# Patient Record
Sex: MALE | Race: WHITE | NOT HISPANIC OR LATINO | Employment: FULL TIME | URBAN - METROPOLITAN AREA
[De-identification: names, ages, dates, MRNs, and addresses within clinical notes are randomized per-mention and may not be internally consistent; named-entity substitution may affect disease eponyms.]

---

## 2017-05-18 ENCOUNTER — OFFICE VISIT (OUTPATIENT)
Dept: URGENT CARE | Facility: MEDICAL CENTER | Age: 47
End: 2017-05-18
Payer: COMMERCIAL

## 2017-05-18 PROCEDURE — 99213 OFFICE O/P EST LOW 20 MIN: CPT

## 2022-04-27 ENCOUNTER — APPOINTMENT (EMERGENCY)
Dept: RADIOLOGY | Facility: HOSPITAL | Age: 52
End: 2022-04-27
Payer: COMMERCIAL

## 2022-04-27 ENCOUNTER — HOSPITAL ENCOUNTER (EMERGENCY)
Facility: HOSPITAL | Age: 52
Discharge: HOME | End: 2022-04-27
Attending: EMERGENCY MEDICINE | Admitting: EMERGENCY MEDICINE
Payer: COMMERCIAL

## 2022-04-27 VITALS
RESPIRATION RATE: 18 BRPM | SYSTOLIC BLOOD PRESSURE: 199 MMHG | HEART RATE: 84 BPM | TEMPERATURE: 97.7 F | DIASTOLIC BLOOD PRESSURE: 112 MMHG | OXYGEN SATURATION: 94 %

## 2022-04-27 DIAGNOSIS — R73.9 HIGH BLOOD SUGAR: ICD-10-CM

## 2022-04-27 DIAGNOSIS — R60.9 PERIPHERAL EDEMA: Primary | ICD-10-CM

## 2022-04-27 DIAGNOSIS — I10 HYPERTENSION: ICD-10-CM

## 2022-04-27 LAB
ALBUMIN SERPL BCP-MCNC: 3.4 G/DL (ref 3.5–5)
ALP SERPL-CCNC: 106 U/L (ref 46–116)
ALT SERPL W P-5'-P-CCNC: 33 U/L (ref 12–78)
ANION GAP SERPL CALCULATED.3IONS-SCNC: 5 MMOL/L (ref 4–13)
AST SERPL W P-5'-P-CCNC: 16 U/L (ref 5–45)
ATRIAL RATE: 85 BPM
BASOPHILS # BLD AUTO: 0.06 THOUSANDS/ΜL (ref 0–0.1)
BASOPHILS NFR BLD AUTO: 1 % (ref 0–1)
BILIRUB SERPL-MCNC: 0.29 MG/DL (ref 0.2–1)
BUN SERPL-MCNC: 17 MG/DL (ref 5–25)
CALCIUM ALBUM COR SERPL-MCNC: 9.1 MG/DL (ref 8.3–10.1)
CALCIUM SERPL-MCNC: 8.6 MG/DL (ref 8.3–10.1)
CHLORIDE SERPL-SCNC: 105 MMOL/L (ref 100–108)
CO2 SERPL-SCNC: 29 MMOL/L (ref 21–32)
CREAT SERPL-MCNC: 0.84 MG/DL (ref 0.6–1.3)
EOSINOPHIL # BLD AUTO: 0.51 THOUSAND/ΜL (ref 0–0.61)
EOSINOPHIL NFR BLD AUTO: 5 % (ref 0–6)
ERYTHROCYTE [DISTWIDTH] IN BLOOD BY AUTOMATED COUNT: 13.8 % (ref 11.6–15.1)
GFR SERPL CREATININE-BSD FRML MDRD: 102 ML/MIN/1.73SQ M
GLUCOSE SERPL-MCNC: 153 MG/DL (ref 65–140)
HCT VFR BLD AUTO: 45.9 % (ref 36.5–49.3)
HGB BLD-MCNC: 15.2 G/DL (ref 12–17)
IMM GRANULOCYTES # BLD AUTO: 0.04 THOUSAND/UL (ref 0–0.2)
IMM GRANULOCYTES NFR BLD AUTO: 0 % (ref 0–2)
LYMPHOCYTES # BLD AUTO: 2.11 THOUSANDS/ΜL (ref 0.6–4.47)
LYMPHOCYTES NFR BLD AUTO: 20 % (ref 14–44)
MCH RBC QN AUTO: 28.7 PG (ref 26.8–34.3)
MCHC RBC AUTO-ENTMCNC: 33.1 G/DL (ref 31.4–37.4)
MCV RBC AUTO: 87 FL (ref 82–98)
MONOCYTES # BLD AUTO: 0.92 THOUSAND/ΜL (ref 0.17–1.22)
MONOCYTES NFR BLD AUTO: 9 % (ref 4–12)
NEUTROPHILS # BLD AUTO: 6.81 THOUSANDS/ΜL (ref 1.85–7.62)
NEUTS SEG NFR BLD AUTO: 65 % (ref 43–75)
NRBC BLD AUTO-RTO: 0 /100 WBCS
P AXIS: 56 DEGREES
PLATELET # BLD AUTO: 272 THOUSANDS/UL (ref 149–390)
PMV BLD AUTO: 9.4 FL (ref 8.9–12.7)
POTASSIUM SERPL-SCNC: 3.6 MMOL/L (ref 3.5–5.3)
PR INTERVAL: 168 MS
PROT SERPL-MCNC: 7.2 G/DL (ref 6.4–8.2)
QRS AXIS: 53 DEGREES
QRSD INTERVAL: 96 MS
QT INTERVAL: 360 MS
QTC INTERVAL: 438 MS
RBC # BLD AUTO: 5.29 MILLION/UL (ref 3.88–5.62)
SODIUM SERPL-SCNC: 139 MMOL/L (ref 136–145)
T WAVE AXIS: 21 DEGREES
VENTRICULAR RATE: 89 BPM
WBC # BLD AUTO: 10.45 THOUSAND/UL (ref 4.31–10.16)

## 2022-04-27 PROCEDURE — 80053 COMPREHEN METABOLIC PANEL: CPT

## 2022-04-27 PROCEDURE — 93005 ELECTROCARDIOGRAM TRACING: CPT

## 2022-04-27 PROCEDURE — 93010 ELECTROCARDIOGRAM REPORT: CPT | Performed by: INTERNAL MEDICINE

## 2022-04-27 PROCEDURE — 99284 EMERGENCY DEPT VISIT MOD MDM: CPT

## 2022-04-27 PROCEDURE — 71046 X-RAY EXAM CHEST 2 VIEWS: CPT

## 2022-04-27 PROCEDURE — 85025 COMPLETE CBC W/AUTO DIFF WBC: CPT

## 2022-04-27 PROCEDURE — 36415 COLL VENOUS BLD VENIPUNCTURE: CPT

## 2022-04-27 PROCEDURE — 99285 EMERGENCY DEPT VISIT HI MDM: CPT

## 2022-04-27 RX ORDER — LOSARTAN POTASSIUM 100 MG/1
100 TABLET ORAL DAILY
COMMUNITY

## 2022-04-27 NOTE — DISCHARGE INSTRUCTIONS
Continue using compression stockings and elevating the legs    Follow up outpatient with family practice for long term management of peripheral edema, as well as further management of high blood sugar and hypertension     Return to the ER for chest pain, trouble breathing, passing out, lightheadedness, and or any new/concerning symptoms

## 2022-04-27 NOTE — ED PROVIDER NOTES
History  Chief Complaint   Patient presents with    Leg Swelling     Pt c/o b/l leg swelling for a few days  The patient is a 29-year-old male with history of hypertension who has not seen a doctor in several years who presents to the emergency department for evaluation of worsening to his bilateral lower extremity edema from his baseline  He states that he always has bilateral lower extremity edema from venous insufficiency, however reports that today, when he put his legs up, it did not improve which is new  He states that usually decreases when he does this, and that he wears compression stockings daily  He denies any other symptoms, including chest pain, dyspnea, flank pain, hematuria, fever, chills, abdominal pain  Prior to Admission Medications   Prescriptions Last Dose Informant Patient Reported? Taking?   losartan (COZAAR) 100 MG tablet 4/26/2022 at 0800  Yes Yes   Sig: Take 100 mg by mouth daily      Facility-Administered Medications: None       Past Medical History:   Diagnosis Date    Hypertension        History reviewed  No pertinent surgical history  History reviewed  No pertinent family history  I have reviewed and agree with the history as documented  E-Cigarette/Vaping     E-Cigarette/Vaping Substances     Social History     Tobacco Use    Smoking status: Never Smoker    Smokeless tobacco: Never Used   Substance Use Topics    Alcohol use: Yes     Comment: ocass   Drug use: Never       Review of Systems   Constitutional: Negative for chills and fever  HENT: Negative for congestion and rhinorrhea  Respiratory: Negative for cough and shortness of breath  Cardiovascular: Positive for leg swelling  Negative for chest pain  Gastrointestinal: Negative for abdominal pain, constipation, diarrhea, nausea and vomiting  Genitourinary: Negative for dysuria and flank pain  Musculoskeletal: Negative for arthralgias and myalgias  Skin: Negative for rash and wound  Neurological: Negative for dizziness, weakness, numbness and headaches  Psychiatric/Behavioral: Negative for behavioral problems  Physical Exam  Physical Exam  Vitals and nursing note reviewed  Constitutional:       General: He is not in acute distress  Appearance: He is well-developed  He is obese  He is not toxic-appearing  HENT:      Head: Normocephalic and atraumatic  Mouth/Throat:      Mouth: Mucous membranes are moist    Eyes:      Conjunctiva/sclera: Conjunctivae normal    Cardiovascular:      Rate and Rhythm: Normal rate and regular rhythm  Pulses: Normal pulses  Heart sounds: No murmur heard  Pulmonary:      Effort: Pulmonary effort is normal  No respiratory distress  Breath sounds: Normal breath sounds  Abdominal:      Palpations: Abdomen is soft  Tenderness: There is no abdominal tenderness  Musculoskeletal:         General: No tenderness or deformity  Normal range of motion  Cervical back: Neck supple  Right lower leg: Edema (2+ pitting) present  Left lower leg: Edema (2+ pitting) present  Comments: No overlying cellulitic changes, skin is dry to bilateral legs with flaking consistent with venous insufficiency, no wounds noted  2+ pulses in all 4 extremities  Skin:     General: Skin is warm and dry  Capillary Refill: Capillary refill takes less than 2 seconds  Neurological:      Mental Status: He is alert           Vital Signs  ED Triage Vitals   Temperature Pulse Respirations Blood Pressure SpO2   04/27/22 0131 04/27/22 0130 04/27/22 0130 04/27/22 0130 04/27/22 0130   97 7 °F (36 5 °C) 88 18 (!) 213/120 96 %      Temp Source Heart Rate Source Patient Position - Orthostatic VS BP Location FiO2 (%)   04/27/22 0131 04/27/22 0130 04/27/22 0130 04/27/22 0130 --   Oral Monitor Lying Right arm       Pain Score       04/27/22 0215       No Pain           Vitals:    04/27/22 0130 04/27/22 0131 04/27/22 0215 04/27/22 0230   BP: (!) 213/120 (!) 213/120 (!) 199/99 (!) 199/112   Pulse: 88 76 84 84   Patient Position - Orthostatic VS: Lying Sitting Sitting Lying         Visual Acuity      ED Medications  Medications - No data to display    Diagnostic Studies  Results Reviewed     Procedure Component Value Units Date/Time    Comprehensive metabolic panel [[id number]]  (Abnormal) Collected: 04/27/22 0212    Lab Status: Final result Specimen: Blood from Arm, Left Updated: 04/27/22 0306     Sodium 139 mmol/L      Potassium 3 6 mmol/L      Chloride 105 mmol/L      CO2 29 mmol/L      ANION GAP 5 mmol/L      BUN 17 mg/dL      Creatinine 0 84 mg/dL      Glucose 153 mg/dL      Calcium 8 6 mg/dL      Corrected Calcium 9 1 mg/dL      AST 16 U/L      ALT 33 U/L      Alkaline Phosphatase 106 U/L      Total Protein 7 2 g/dL      Albumin 3 4 g/dL      Total Bilirubin 0 29 mg/dL      eGFR 102 ml/min/1 73sq m     Narrative:      National Kidney Disease Foundation guidelines for Chronic Kidney Disease (CKD):     Stage 1 with normal or high GFR (GFR > 90 mL/min/1 73 square meters)    Stage 2 Mild CKD (GFR = 60-89 mL/min/1 73 square meters)    Stage 3A Moderate CKD (GFR = 45-59 mL/min/1 73 square meters)    Stage 3B Moderate CKD (GFR = 30-44 mL/min/1 73 square meters)    Stage 4 Severe CKD (GFR = 15-29 mL/min/1 73 square meters)    Stage 5 End Stage CKD (GFR <15 mL/min/1 73 square meters)  Note: GFR calculation is accurate only with a steady state creatinine    CBC and differential [[id number]]  (Abnormal) Collected: 04/27/22 0212    Lab Status: Final result Specimen: Blood from Arm, Left Updated: 04/27/22 0220     WBC 10 45 Thousand/uL      RBC 5 29 Million/uL      Hemoglobin 15 2 g/dL      Hematocrit 45 9 %      MCV 87 fL      MCH 28 7 pg      MCHC 33 1 g/dL      RDW 13 8 %      MPV 9 4 fL      Platelets 471 Thousands/uL      nRBC 0 /100 WBCs      Neutrophils Relative 65 %      Immat GRANS % 0 %      Lymphocytes Relative 20 %      Monocytes Relative 9 % Eosinophils Relative 5 %      Basophils Relative 1 %      Neutrophils Absolute 6 81 Thousands/µL      Immature Grans Absolute 0 04 Thousand/uL      Lymphocytes Absolute 2 11 Thousands/µL      Monocytes Absolute 0 92 Thousand/µL      Eosinophils Absolute 0 51 Thousand/µL      Basophils Absolute 0 06 Thousands/µL                  XR chest 2 views   ED Interpretation by Medardo Mohamud PA-C (04/27 0315)   No evidence of infiltrate, pneumothorax, or acute cardiopulmonary disease as interpreted by me                 Procedures  ECG 12 Lead Documentation Only    Date/Time: 4/27/2022 2:20 AM  Performed by: Medardo Mohamud PA-C  Authorized by: Medardo Mohamud PA-C     Comments:      NSR at 89 BPM, normal axis, normal intervals, no ST elevation or depression              ED Course           SBIRT 22yo+      Most Recent Value   SBIRT (23 yo +)    In order to provide better care to our patients, we are screening all of our patients for alcohol and drug use  Would it be okay to ask you these screening questions?  No Filed at: 04/27/2022 0206              MDM  Number of Diagnoses or Management Options  High blood sugar: new and does not require workup  Hypertension: established and worsening  Peripheral edema: established and worsening     Amount and/or Complexity of Data Reviewed  Clinical lab tests: ordered and reviewed  Tests in the radiology section of CPT®: ordered and reviewed  Decide to obtain previous medical records or to obtain history from someone other than the patient: yes  Review and summarize past medical records: yes  Independent visualization of images, tracings, or specimens: yes    Risk of Complications, Morbidity, and/or Mortality  Presenting problems: moderate  Management options: low    Patient Progress  Patient progress: stable    The patient is a 29-year-old male with history of venous insufficiency and hypertension who presents to the emergency department for worsening of his bilateral lower extremity edema  Patient has edema bilaterally at baseline, however reports that it did not decrease with lifting his legs tonight which is abnormal  He denies chest pain, dyspnea, numbness, paresthesias, lightheadedness, dizziness  I discussed with the patient his elevated blood pressure as noted in the ED  Patient does have a history of HTN  Patient is without evidence of end organ damage and denies visual changes, chest pain, numbness/pareshtesias, and or weakness  I discussed the importance of follow-up with PCP regarding HTN; patient verbalized understanding  Will order CBC, CMP, CXR, and EKG    Lab work unremarkable  EKG nonischemic  CXR without acute cardiopulmonary disease  At the time of discharge, the patient is in no acute distress  I discussed with the patient the diagnosis, treatment plan, prompt PCP follow-up, return precautions, and discharge instructions; they were given the opportunity to ask questions and verbalized understanding  Disposition  Final diagnoses:   Peripheral edema   Hypertension   High blood sugar     Time reflects when diagnosis was documented in both MDM as applicable and the Disposition within this note     Time User Action Codes Description Comment    4/27/2022  3:15 AM Jose Alfredo Call Add [R60 9] Peripheral edema     4/27/2022  3:16 AM Jose Alfredo Call Add [I10] Hypertension     4/27/2022  3:17 AM Jose Alfredo Call Add [R73 9] High blood sugar       ED Disposition     ED Disposition Condition Date/Time Comment    Discharge Stable Wed Apr 27, 2022 [id number] [facility] 281 discharge to home/self care              Follow-up Information     Follow up With Specialties Details Why Contact Info Additional [id number] [address] Kip Ang DO Family Medicine Call   [address]  [address] [place] [id number] [phone]       [facility] Family Medicine   [address] [address] [phone]  [facility] [place], [address]  [address], [address], [place], [place], [address] [phone]          Discharge Medication List as of 4/27/2022  3:18 AM      CONTINUE these medications which have NOT CHANGED    Details   losartan (COZAAR) 100 MG tablet Take 100 mg by mouth daily, Historical Med             No discharge procedures on file      PDMP Review     None          ED Provider  Electronically Signed by           Onnie Soulier, PA-C  04/27/22 0001

## 2024-05-10 PROBLEM — C43.59 MALIGNANT MELANOMA OF TORSO EXCLUDING BREAST (HCC): Status: ACTIVE | Noted: 2024-05-10

## 2024-05-15 ENCOUNTER — TELEPHONE (OUTPATIENT)
Dept: GYNECOLOGIC ONCOLOGY | Facility: CLINIC | Age: 54
End: 2024-05-15

## 2024-05-15 ENCOUNTER — CONSULT (OUTPATIENT)
Dept: SURGICAL ONCOLOGY | Facility: CLINIC | Age: 54
End: 2024-05-15
Payer: COMMERCIAL

## 2024-05-15 ENCOUNTER — LAB REQUISITION (OUTPATIENT)
Dept: LAB | Facility: HOSPITAL | Age: 54
End: 2024-05-15
Payer: COMMERCIAL

## 2024-05-15 VITALS
WEIGHT: 315 LBS | HEIGHT: 72 IN | BODY MASS INDEX: 42.66 KG/M2 | OXYGEN SATURATION: 99 % | DIASTOLIC BLOOD PRESSURE: 100 MMHG | TEMPERATURE: 97.7 F | SYSTOLIC BLOOD PRESSURE: 162 MMHG | HEART RATE: 83 BPM

## 2024-05-15 DIAGNOSIS — C43.59 MALIGNANT MELANOMA OF TORSO EXCLUDING BREAST (HCC): Primary | ICD-10-CM

## 2024-05-15 DIAGNOSIS — C43.59 MALIGNANT MELANOMA OF OTHER PART OF TRUNK (HCC): ICD-10-CM

## 2024-05-15 PROCEDURE — 88321 CONSLTJ&REPRT SLD PREP ELSWR: CPT | Performed by: STUDENT IN AN ORGANIZED HEALTH CARE EDUCATION/TRAINING PROGRAM

## 2024-05-15 PROCEDURE — 99204 OFFICE O/P NEW MOD 45 MIN: CPT | Performed by: SURGERY

## 2024-05-15 RX ORDER — TRAMADOL HYDROCHLORIDE 50 MG/1
50 TABLET ORAL EVERY 6 HOURS PRN
Qty: 10 TABLET | Refills: 0 | Status: SHIPPED | OUTPATIENT
Start: 2024-05-15
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
## 2024-05-17 ENCOUNTER — TELEPHONE (OUTPATIENT)
Dept: HEMATOLOGY ONCOLOGY | Facility: CLINIC | Age: 54
End: 2024-05-17

## 2024-05-17 NOTE — TELEPHONE ENCOUNTER
Patient Call    Who are you speaking with? Patient    If it is not the patient, are they listed on an active communication consent form? N/A   What is the reason for this call? Patient calling in regards to his upcoming surgery with Dr Cutler.  Patient wants to confirm that he is scheduled for surgery on 5/22/24. Patient also has a few questions about surgery and would like to speak with the nurse in regards to the surgery questions.  Patient would like a call back.    Does this require a call back? Yes   If a call back is required, please list best call back number [phone], patient gave permission to leave a message    If a call back is required, advise that a message will be forwarded to their care team and someone will return their call as soon as possible.   Did you relay this information to the patient? Yes

## 2024-05-20 ENCOUNTER — APPOINTMENT (OUTPATIENT)
Dept: LAB | Facility: HOSPITAL | Age: 54
End: 2024-05-20
Payer: COMMERCIAL

## 2024-05-20 ENCOUNTER — HOSPITAL ENCOUNTER (OUTPATIENT)
Dept: RADIOLOGY | Facility: HOSPITAL | Age: 54
Discharge: HOME | End: 2024-05-20
Payer: COMMERCIAL

## 2024-05-20 DIAGNOSIS — C43.59 MALIGNANT MELANOMA OF TORSO EXCLUDING BREAST (HCC): ICD-10-CM

## 2024-05-20 LAB
ALBUMIN SERPL BCP-MCNC: 4.3 G/DL (ref 3.5–5)
ALP SERPL-CCNC: 75 U/L (ref 34–104)
ALT SERPL W P-5'-P-CCNC: 23 U/L (ref 7–52)
ANION GAP SERPL CALCULATED.3IONS-SCNC: 7 MMOL/L (ref 4–13)
AST SERPL W P-5'-P-CCNC: 16 U/L (ref 13–39)
BASOPHILS # BLD AUTO: 0.1 THOUSANDS/ÂΜL (ref 0–0.1)
BASOPHILS NFR BLD AUTO: 1 % (ref 0–1)
BILIRUB SERPL-MCNC: 0.83 MG/DL (ref 0.2–1)
BUN SERPL-MCNC: 13 MG/DL (ref 5–25)
CALCIUM SERPL-MCNC: 9.4 MG/DL (ref 8.4–10.2)
CHLORIDE SERPL-SCNC: 105 MMOL/L (ref 96–108)
CO2 SERPL-SCNC: 28 MMOL/L (ref 21–32)
CREAT SERPL-MCNC: 0.77 MG/DL (ref 0.6–1.3)
EOSINOPHIL # BLD AUTO: 0.35 THOUSAND/ÂΜL (ref 0–0.61)
EOSINOPHIL NFR BLD AUTO: 3 % (ref 0–6)
ERYTHROCYTE [DISTWIDTH] IN BLOOD BY AUTOMATED COUNT: 13.9 % (ref 11.6–15.1)
GFR SERPL CREATININE-BSD FRML MDRD: 105 ML/MIN/1.73SQ M
GLUCOSE P FAST SERPL-MCNC: 91 MG/DL (ref 65–99)
HCT VFR BLD AUTO: 44.9 % (ref 36.5–49.3)
HGB BLD-MCNC: 15.3 G/DL (ref 12–17)
IMM GRANULOCYTES # BLD AUTO: 0.04 THOUSAND/UL (ref 0–0.2)
IMM GRANULOCYTES NFR BLD AUTO: 0 % (ref 0–2)
LYMPHOCYTES # BLD AUTO: 2.63 THOUSANDS/ÂΜL (ref 0.6–4.47)
LYMPHOCYTES NFR BLD AUTO: 21 % (ref 14–44)
MCH RBC QN AUTO: 29.3 PG (ref 26.8–34.3)
MCHC RBC AUTO-ENTMCNC: 34.1 G/DL (ref 31.4–37.4)
MCV RBC AUTO: 86 FL (ref 82–98)
MONOCYTES # BLD AUTO: 1.01 THOUSAND/ÂΜL (ref 0.17–1.22)
MONOCYTES NFR BLD AUTO: 8 % (ref 4–12)
NEUTROPHILS # BLD AUTO: 8.38 THOUSANDS/ÂΜL (ref 1.85–7.62)
NEUTS SEG NFR BLD AUTO: 67 % (ref 43–75)
NRBC BLD AUTO-RTO: 0 /100 WBCS
PLATELET # BLD AUTO: 293 THOUSANDS/UL (ref 149–390)
PMV BLD AUTO: 9.9 FL (ref 8.9–12.7)
POTASSIUM SERPL-SCNC: 4.2 MMOL/L (ref 3.5–5.3)
PROT SERPL-MCNC: 7.5 G/DL (ref 6.4–8.4)
RBC # BLD AUTO: 5.23 MILLION/UL (ref 3.88–5.62)
SODIUM SERPL-SCNC: 140 MMOL/L (ref 135–147)
WBC # BLD AUTO: 12.51 THOUSAND/UL (ref 4.31–10.16)

## 2024-05-20 PROCEDURE — 80053 COMPREHEN METABOLIC PANEL: CPT

## 2024-05-20 PROCEDURE — 85025 COMPLETE CBC W/AUTO DIFF WBC: CPT

## 2024-05-20 PROCEDURE — 71046 X-RAY EXAM CHEST 2 VIEWS: CPT

## 2024-05-20 PROCEDURE — 36415 COLL VENOUS BLD VENIPUNCTURE: CPT

## 2024-05-21 ENCOUNTER — ANESTHESIA EVENT (OUTPATIENT)
Dept: PERIOP | Facility: HOSPITAL | Age: 54
End: 2024-05-21
Payer: COMMERCIAL

## 2024-05-21 LAB
ATRIAL RATE: 96 BPM
P AXIS: 52 DEGREES
PR INTERVAL: 208 MS
QRS AXIS: 28 DEGREES
QRSD INTERVAL: 102 MS
QT INTERVAL: 362 MS
QTC INTERVAL: 457 MS
T WAVE AXIS: 36 DEGREES
VENTRICULAR RATE: 96 BPM

## 2024-05-21 PROCEDURE — 93010 ELECTROCARDIOGRAM REPORT: CPT | Performed by: INTERNAL MEDICINE

## 2024-05-21 NOTE — PRE-PROCEDURE INSTRUCTIONS
Pre-Surgery Instructions:   Medication Instructions    losartan (COZAAR) 100 MG tablet Hold day of surgery.    Medication instructions for day surgery reviewed. Please use only a sip of water to take your instructed medications. Avoid all over the counter vitamins, supplements and NSAIDS for one week prior to surgery per anesthesia guidelines. Tylenol is ok to take as needed.     You will receive a call one business day prior to surgery with an arrival time and hospital directions. If your surgery is scheduled on a Monday, the hospital will be calling you on the Friday prior to your surgery. If you have not heard from anyone by 8pm, please call the hospital supervisor through the hospital  at [phone]. ([place] [phone] or [place] [phone]).    Do not eat or drink anything after midnight the night before your surgery, including candy, mints, lifesavers, or chewing gum. Do not drink alcohol 24hrs before your surgery. Try not to smoke at least 24hrs before your surgery.       Follow the pre surgery showering instructions as listed in the “My Surgical Experience Booklet” or otherwise provided by your surgeon's office. Do not use a blade to shave the surgical area 1 week before surgery. It is okay to use a clean electric clippers up to 24 hours before surgery. Do not apply any lotions, creams, including makeup, cologne, deodorant, or perfumes after showering on the day of your surgery. Do not use dry shampoo, hair spray, hair gel, or any type of hair products.     No contact lenses, eye make-up, or artificial eyelashes. Remove nail polish, including gel polish, and any artificial, gel, or acrylic nails if possible. Remove all jewelry including rings and body piercing jewelry.     Wear causal clothing that is easy to take on and off. Consider your type of surgery.    Keep any valuables, jewelry, piercings at home. Please bring any specially ordered equipment (sling, braces) if indicated.    Arrange  for a responsible person to drive you to and from the hospital on the day of your surgery. Please confirm the visitor policy for the day of your procedure when you receive your phone call with an arrival time.     Call the surgeon's office with any new illnesses, exposures, or additional questions prior to surgery.    Please reference your “My Surgical Experience Booklet” for additional information to prepare for your upcoming surgery.

## 2024-05-22 ENCOUNTER — TELEPHONE (OUTPATIENT)
Age: 54
End: 2024-05-22

## 2024-05-22 ENCOUNTER — HOSPITAL ENCOUNTER (OUTPATIENT)
Facility: HOSPITAL | Age: 54
Setting detail: OUTPATIENT SURGERY
Discharge: HOME | End: 2024-05-22
Attending: SURGERY | Admitting: SURGERY
Payer: COMMERCIAL

## 2024-05-22 ENCOUNTER — ANESTHESIA (OUTPATIENT)
Dept: PERIOP | Facility: HOSPITAL | Age: 54
End: 2024-05-22
Payer: COMMERCIAL

## 2024-05-22 ENCOUNTER — HOSPITAL ENCOUNTER (OUTPATIENT)
Dept: NUCLEAR MEDICINE | Facility: HOSPITAL | Age: 54
Discharge: HOME | End: 2024-05-22
Attending: SURGERY
Payer: COMMERCIAL

## 2024-05-22 VITALS
HEART RATE: 89 BPM | RESPIRATION RATE: 14 BRPM | OXYGEN SATURATION: 92 % | DIASTOLIC BLOOD PRESSURE: 89 MMHG | BODY MASS INDEX: 42.66 KG/M2 | HEIGHT: 72 IN | TEMPERATURE: 97.9 F | WEIGHT: 315 LBS | SYSTOLIC BLOOD PRESSURE: 158 MMHG

## 2024-05-22 DIAGNOSIS — C43.59 MALIGNANT MELANOMA OF TORSO EXCLUDING BREAST (HCC): ICD-10-CM

## 2024-05-22 PROBLEM — I10 HTN (HYPERTENSION): Status: ACTIVE | Noted: 2024-05-22

## 2024-05-22 PROBLEM — K27.9 PUD (PEPTIC ULCER DISEASE): Status: ACTIVE | Noted: 2024-05-22

## 2024-05-22 PROCEDURE — A9541 TC99M SULFUR COLLOID: HCPCS

## 2024-05-22 PROCEDURE — 14001 TIS TRNFR TRUNK 10.1-30SQCM: CPT | Performed by: SURGERY

## 2024-05-22 PROCEDURE — 38900 IO MAP OF SENT LYMPH NODE: CPT | Performed by: SURGERY

## 2024-05-22 PROCEDURE — 38531 OPEN BX/EXC INGUINOFEM NODES: CPT | Performed by: SURGERY

## 2024-05-22 PROCEDURE — 88305 TISSUE EXAM BY PATHOLOGIST: CPT | Performed by: PATHOLOGY

## 2024-05-22 PROCEDURE — 88341 IMHCHEM/IMCYTCHM EA ADD ANTB: CPT | Performed by: PATHOLOGY

## 2024-05-22 PROCEDURE — 78195 LYMPH SYSTEM IMAGING: CPT

## 2024-05-22 PROCEDURE — 88307 TISSUE EXAM BY PATHOLOGIST: CPT | Performed by: PATHOLOGY

## 2024-05-22 PROCEDURE — 88342 IMHCHEM/IMCYTCHM 1ST ANTB: CPT | Performed by: PATHOLOGY

## 2024-05-22 RX ORDER — CEFAZOLIN SODIUM 1 G/3ML
INJECTION, POWDER, FOR SOLUTION INTRAMUSCULAR; INTRAVENOUS AS NEEDED
Status: DISCONTINUED | OUTPATIENT
Start: 2024-05-22 | End: 2024-05-22

## 2024-05-22 RX ORDER — ISOSULFAN BLUE 50 MG/5ML
INJECTION, SOLUTION SUBCUTANEOUS AS NEEDED
Status: DISCONTINUED | OUTPATIENT
Start: 2024-05-22 | End: 2024-05-22 | Stop reason: HOSPADM

## 2024-05-22 RX ORDER — ONDANSETRON 2 MG/ML
INJECTION INTRAMUSCULAR; INTRAVENOUS AS NEEDED
Status: DISCONTINUED | OUTPATIENT
Start: 2024-05-22 | End: 2024-05-22

## 2024-05-22 RX ORDER — DEXAMETHASONE SODIUM PHOSPHATE 10 MG/ML
INJECTION, SOLUTION INTRAMUSCULAR; INTRAVENOUS AS NEEDED
Status: DISCONTINUED | OUTPATIENT
Start: 2024-05-22 | End: 2024-05-22

## 2024-05-22 RX ORDER — MIDAZOLAM HYDROCHLORIDE 2 MG/2ML
INJECTION, SOLUTION INTRAMUSCULAR; INTRAVENOUS AS NEEDED
Status: DISCONTINUED | OUTPATIENT
Start: 2024-05-22 | End: 2024-05-22

## 2024-05-22 RX ORDER — BUPIVACAINE HYDROCHLORIDE 2.5 MG/ML
INJECTION, SOLUTION EPIDURAL; INFILTRATION; INTRACAUDAL AS NEEDED
Status: DISCONTINUED | OUTPATIENT
Start: 2024-05-22 | End: 2024-05-22 | Stop reason: HOSPADM

## 2024-05-22 RX ORDER — HYDROMORPHONE HCL/PF 1 MG/ML
0.5 SYRINGE (ML) INJECTION
Status: DISCONTINUED | OUTPATIENT
Start: 2024-05-22 | End: 2024-05-22 | Stop reason: HOSPADM

## 2024-05-22 RX ORDER — PROPOFOL 10 MG/ML
INJECTION, EMULSION INTRAVENOUS AS NEEDED
Status: DISCONTINUED | OUTPATIENT
Start: 2024-05-22 | End: 2024-05-22

## 2024-05-22 RX ORDER — ROCURONIUM BROMIDE 10 MG/ML
INJECTION, SOLUTION INTRAVENOUS AS NEEDED
Status: DISCONTINUED | OUTPATIENT
Start: 2024-05-22 | End: 2024-05-22

## 2024-05-22 RX ORDER — FENTANYL CITRATE 50 UG/ML
INJECTION, SOLUTION INTRAMUSCULAR; INTRAVENOUS AS NEEDED
Status: DISCONTINUED | OUTPATIENT
Start: 2024-05-22 | End: 2024-05-22

## 2024-05-22 RX ORDER — LIDOCAINE HYDROCHLORIDE 20 MG/ML
INJECTION, SOLUTION EPIDURAL; INFILTRATION; INTRACAUDAL; PERINEURAL AS NEEDED
Status: DISCONTINUED | OUTPATIENT
Start: 2024-05-22 | End: 2024-05-22

## 2024-05-22 RX ORDER — KETAMINE HCL IN NACL, ISO-OSM 100MG/10ML
SYRINGE (ML) INJECTION AS NEEDED
Status: DISCONTINUED | OUTPATIENT
Start: 2024-05-22 | End: 2024-05-22

## 2024-05-22 RX ORDER — FENTANYL CITRATE/PF 50 MCG/ML
50 SYRINGE (ML) INJECTION
Status: DISCONTINUED | OUTPATIENT
Start: 2024-05-22 | End: 2024-05-22 | Stop reason: HOSPADM

## 2024-05-22 RX ORDER — GINSENG 100 MG
CAPSULE ORAL AS NEEDED
Status: DISCONTINUED | OUTPATIENT
Start: 2024-05-22 | End: 2024-05-22 | Stop reason: HOSPADM

## 2024-05-22 RX ORDER — SODIUM CHLORIDE 9 MG/ML
125 INJECTION, SOLUTION INTRAVENOUS CONTINUOUS
Status: DISCONTINUED | OUTPATIENT
Start: 2024-05-22 | End: 2024-05-22 | Stop reason: HOSPADM

## 2024-05-22 RX ORDER — ONDANSETRON 2 MG/ML
4 INJECTION INTRAMUSCULAR; INTRAVENOUS ONCE AS NEEDED
Status: DISCONTINUED | OUTPATIENT
Start: 2024-05-22 | End: 2024-05-22 | Stop reason: HOSPADM

## 2024-05-22 RX ADMIN — Medication 50 MG: at 15:18

## 2024-05-22 RX ADMIN — FENTANYL CITRATE 50 MCG: 50 INJECTION INTRAMUSCULAR; INTRAVENOUS at 15:18

## 2024-05-22 RX ADMIN — FENTANYL CITRATE 50 MCG: 50 INJECTION INTRAMUSCULAR; INTRAVENOUS at 15:52

## 2024-05-22 RX ADMIN — SODIUM CHLORIDE: 0.9 INJECTION, SOLUTION INTRAVENOUS at 18:10

## 2024-05-22 RX ADMIN — SODIUM CHLORIDE: 0.9 INJECTION, SOLUTION INTRAVENOUS at 15:38

## 2024-05-22 RX ADMIN — DEXAMETHASONE SODIUM PHOSPHATE 10 MG: 10 INJECTION INTRAMUSCULAR; INTRAVENOUS at 15:18

## 2024-05-22 RX ADMIN — LIDOCAINE HYDROCHLORIDE 100 MG: 20 INJECTION, SOLUTION EPIDURAL; INFILTRATION; INTRACAUDAL at 15:18

## 2024-05-22 RX ADMIN — ROCURONIUM BROMIDE 60 MG: 10 INJECTION, SOLUTION INTRAVENOUS at 15:18

## 2024-05-22 RX ADMIN — ROCURONIUM BROMIDE 20 MG: 10 INJECTION, SOLUTION INTRAVENOUS at 16:45

## 2024-05-22 RX ADMIN — PROPOFOL 200 MG: 10 INJECTION, EMULSION INTRAVENOUS at 15:18

## 2024-05-22 RX ADMIN — MIDAZOLAM 2 MG: 1 INJECTION INTRAMUSCULAR; INTRAVENOUS at 15:12

## 2024-05-22 RX ADMIN — ROCURONIUM BROMIDE 20 MG: 10 INJECTION, SOLUTION INTRAVENOUS at 16:05

## 2024-05-22 RX ADMIN — ROCURONIUM BROMIDE 10 MG: 10 INJECTION, SOLUTION INTRAVENOUS at 17:23

## 2024-05-22 RX ADMIN — FENTANYL CITRATE 50 MCG: 50 INJECTION INTRAMUSCULAR; INTRAVENOUS at 17:23

## 2024-05-22 RX ADMIN — SODIUM CHLORIDE: 0.9 INJECTION, SOLUTION INTRAVENOUS at 14:50

## 2024-05-22 RX ADMIN — SUGAMMADEX 400 MG: 100 INJECTION, SOLUTION INTRAVENOUS at 17:58

## 2024-05-22 RX ADMIN — CEFAZOLIN 3000 MG: 1 INJECTION, POWDER, FOR SOLUTION INTRAMUSCULAR; INTRAVENOUS; PARENTERAL at 15:36

## 2024-05-22 RX ADMIN — ONDANSETRON 4 MG: 2 INJECTION INTRAMUSCULAR; INTRAVENOUS at 15:18

## 2024-05-22 RX ADMIN — FENTANYL CITRATE 50 MCG: 50 INJECTION INTRAMUSCULAR; INTRAVENOUS at 16:46

## 2024-05-22 NOTE — ANESTHESIA PREPROCEDURE EVALUATION
Procedure:  WIDE EXCISION BACK MELANOMA (Back)  BX LYMPH NODE SENTINEL, LYMPHOCINTIGRAPHY, INTRAOPERATIVE LYMPHATIC MAPPING; (Axilla)    Relevant Problems   CARDIO   (+) HTN (hypertension)      GI/HEPATIC   (+) PUD (peptic ulcer disease)        Physical Exam    Airway    Mallampati score: III  TM Distance: >3 FB  Neck ROM: limited     Dental   No notable dental hx     Cardiovascular  Rhythm: regular, Rate: normal, Cardiovascular exam normal    Pulmonary  Pulmonary exam normal Breath sounds clear to auscultation    Other Findings        Anesthesia Plan  ASA Score- 3     Anesthesia Type- general with ASA Monitors.         Additional Monitors:     Airway Plan:            Plan Factors-    Chart reviewed. EKG reviewed.  Existing labs reviewed. Patient summary reviewed.    Patient is a current smoker.              Induction- intravenous.    Postoperative Plan- Plan for postoperative opioid use. Planned trial extubation    Perioperative Resuscitation Plan - Level 1 - Full Code.       Informed Consent- Anesthetic plan and risks discussed with patient.

## 2024-05-22 NOTE — TELEPHONE ENCOUNTER
Chelly  called in to find out the name of patients nurse. He is currently admitted to [facility], provided contact number to hospital for assistance.

## 2024-05-22 NOTE — CASE MANAGEMENT
Case Management Discharge Planning Note    Patient name Da Line  Location OR POOL/OR POOL MRN [id number]  : 1970 Date 2024       Current Admission Date: 2024  Current Admission Diagnosis:HTN (hypertension)   Patient Active Problem List    Diagnosis Date Noted Date Diagnosed    HTN (hypertension) 2024     PUD (peptic ulcer disease) 2024     Malignant melanoma of torso excluding breast (HCC) 05/10/2024       LOS (days): 0  Geometric Mean LOS (GMLOS) (days):   Days to GMLOS:     OBJECTIVE:            Current admission status: Outpatient Surgery   Preferred Pharmacy:   [facility] [address] [place] [address] ROAD  [address] [facility] [address]  Phone: [phone] Fax: [phone]    Primary Care Provider: Jose Jane DO    Primary Insurance: [facility]  Secondary Insurance:     DISCHARGE DETAILS:       Transport at Discharge : Wheelchair van       Additional Comments: CM met with patient at same day surgery, CM discussed transporation post procedure. CM discussed patient to be transported in [facility], as pateint will have sedation prior, patient requested Uber or Lyft, CM explained due to procedure sedation patient could be accompied in [facility]/[employer]ber requested. CM also explained there would be a fee associated with [facility], patient was asking cost, CM messaged amount to bedside nurse approximate amount. CM requested [facility] in [place]. CM explained to patient he could have family or friends transport, patient explained he does not have anyone present in state and his daughter does not drive. CM to follow.    Irina Quinones,

## 2024-05-22 NOTE — ANESTHESIA POSTPROCEDURE EVALUATION
Post-Op Assessment Note    CV Status:  Stable    Pain management: adequate       Mental Status:  Arousable     Post Op Vitals Reviewed: Yes    No anethesia notable event occurred.    Staff: CRNA               BP (!) 189/87 (05/22/24 1830)    Temp 97.5 °F (36.4 °C) (05/22/24 1830)    Pulse 88 (05/22/24 1830)   Resp 16 (05/22/24 1830)    SpO2 90 % (05/22/24 1830)

## 2024-05-22 NOTE — OP NOTE
OPERATIVE REPORT  PATIENT NAME: Da Rogel    :  1970  MRN: [id number]  Pt Location: [facility] OR ROOM 06    SURGERY DATE: 2024    Surgeons and Role:     * Bradford Cutler MD - Primary     * Ciara Ferrari MD - Assisting    Preop Diagnosis:  Malignant melanoma of torso excluding breast (HCC) [C43.59]    Post-Op Diagnosis Codes:     * Malignant melanoma of torso excluding breast (HCC) [C43.59]    Procedure(s):  WIDE EXCISION BACK MELANOMA; ADVANCEMENT FLAP CLOSURE OF DEFECT  BX LYMPH NODE SENTINEL,  INGUINAL. LYMPHOCINTIGRAPHY. INTRAOPERATIVE LYMPHATIC MAPPING;    Specimen(s):  ID Type Source Tests Collected by Time Destination   1 : back melanoma 12 o'clock 1 clip, 3 o'clock 2 clip, 6 o'clock 3 clip and 9 o'clock is 4 clip Tissue Soft Tissue, Other TISSUE EXAM Bradford Cutler MD 2024 1601    2 : left inguinal sentinel node stitch marks hottest part Tissue Lymph Node, [place] TISSUE EXAM Bradford Cutler MD 2024 1600    3 : left inguinal sentinel node number 2 Tissue Lymph Node, [place] TISSUE EXAM Bradford Cutler MD 2024 1749        Estimated Blood Loss:   Minimal    Drains:  * No LDAs found *    Anesthesia Type:   General    Operative Indications:  Malignant melanoma of torso excluding breast (HCC) [C43.59]      Operative Findings:  1.5 mm deep melanoma 1 cm in diameter , mid back midline.  Resulting defect post excision measuring 7.0 x 3.5 x 1.5 cm in dimension    Left inguinal sentinel node with maximum gamma count of 91.  Second left inguinal sentinel node sent with gamma count of 8.        Complications:   None    Procedure and Technique:  Patient is a 53-year-old man with a 1.5 mm deep melanoma of the mid back in the midline.  He comes in for wide excision and sentinel node biopsy.    He had been marked by the radiology team in the left groin for the sentinel node.  He was then brought to the operating room and identified by proper timeout.  He is then intubated by the anesthesia  team and positioned in the right lateral decubitus position.  The back lesion identified.  He was prepped and draped in usual fashion.  5 cc of methylene blue was injected in the dermal compartment in the specimen around the lesion.  1.5 cm margins were drawn around the lesion and the skin anesthetized.  The ellipse was incised sharply.  Cautery was used to obtain a full-thickness excision down to back fascia.  The resulting defect measured 7.0 x 3.5 x 1.5 cm in size.  Specimen was oriented with sutures and clips.  We then inspected the field for hemostasis.  Flaps were then created by undermining the skin in all directions to allow for advancement flap closure of the wound.  We then used 0 Vicryl sutures in interrupted fashion to close the deep dermal layers.  We then used interrupted vertical mattress 2-0 nylon sutures for the rest of the closure.  Antibiotic was applied followed by gauze and Tegaderm.    Patient was then repositioned and redraped after the back portion of the operation.  He was prepped with the left groin exposed.  The lymphoscintigraphy marks were identified.  The area is shaved prepped draped in usual fashion.  Incisions were made over the marked area after the skin was anesthetized.  Cautery was used to dissect the dermis and subtenons tissue to help with locate radioactive node packet.  The first of these had a maximum gamma count of 91 and was dissected out from surrounding tissue in hemostatic fashion.  Small vessels were clipped.  A second lymphatic packet was sent as well.  The field was then irrigated, and once were sure of hemostasis, 3-0 Vicryl used to close the Elina's fascia followed by 3-0 Vicryl for the dermis followed by 4 Monocryl subcuticular skin closure.  Skin glue was then applied.  Patient was then awakened and transferred to recovery room in stable condition.   I was present for the entire procedure.    Patient Disposition:  PACU     Wide Local Excision for Primary  Cutaneous Melanoma - Excision 1 (Back)  Operation performed with curative intent Yes   Original Breslow thickness of the lesion 1.5 mm (to the tenth of a millimeter)   Clinical margin width   (measured from the edge of the lesion or the prior excision scar) Other: 1.5 cm due to cosmetic/anatomic concerns   Depth of excision Full-thickness skin/subcutaneous tissue down to fascia (melanoma)            SIGNATURE: Bradford Cutler MD  DATE: May 22, 2024  TIME: 6:07 PM

## 2024-05-22 NOTE — OR NURSING
Contact lenses put in case and returned to [facility], JUANA Awan RN. VIV Solomon RN

## 2024-05-23 NOTE — DISCHARGE INSTR - AVS FIRST PAGE
Surgery Discharge Instructions     Please follow-up as instructed. If you do not already have a follow-up appointment, please call the office when you leave to schedule an appointment to be seen in 2-3 weeks for post-operative re-evaluation.     Activity:  - No lifting greater than 20 pounds or strenuous physical activity or exercise for 2 weeks.  - Walking and normal light activities are encouraged.  - Normal daily activities including climbing steps are okay.  - No driving until no longer using pain medications.     Return to work:    - You may return to work when you are feeling well enough.     Diet:    - You may resume your normal diet.     Wound Care:  - May shower daily. No tub baths or swimming until cleared by your surgeon.  - Wash incision gently with soap and water and pat dry.  - Do not apply any creams or ointments unless instructed to do so by your surgeon.  - You may apply ice as needed (no longer than 20 minutes at a time) for the first 48 hours.  - Bruising is not unusual and will go away with a little time. You may apply a warm, moist compress that may help the bruising resolve quicker.  - You may remove the dressings the day after surgery (unless otherwise instructed). Leave any skin tapes (steri-strips) on the incision(s) in place until they fall off on their own. Any new dressings are optional.     Medications:    - You may resume all of your regular medications after discharge unless otherwise instructed. Please refer to your discharge medication list for further details.  - Please take the pain medications as directed.  - You are encouraged to use non-narcotic pain medications first and whenever possible. Reserve the use of narcotic pain medication for moderate to severe pain not controlled by non-narcotic medications.  - No driving while taking narcotic pain medications.  - You may become constipated, especially if taking pain medications. You may take any over the counter stool softeners or  laxatives as needed. Examples: Milk of Magnesia, Colace, Senna.    Additional Instructions:  - If you have any questions or concerns after discharge please call the office.  - Call office or return to ER if fever greater than 101, chills, persistent nausea/vomiting, worsening/uncontrollable pain, and/or increasing redness or purulent/foul smelling drainage from incision(s).

## 2024-06-04 ENCOUNTER — TELEPHONE (OUTPATIENT)
Dept: HEMATOLOGY ONCOLOGY | Facility: CLINIC | Age: 54
End: 2024-06-04

## 2024-06-04 NOTE — TELEPHONE ENCOUNTER
Appointment Confirmation   Who are you speaking with? Patient   If it is not the patient, are they listed on an active communication consent form? N/A   Which provider is the appointment scheduled with?  Dr. Cutler   When is the appointment scheduled?  Please list date and time 06/05/2024 @ 9:30AM    At which location is the appointment scheduled to take place? [place]   Did caller verbalize understanding of appointment details? Yes

## 2024-06-05 ENCOUNTER — OFFICE VISIT (OUTPATIENT)
Dept: SURGICAL ONCOLOGY | Facility: CLINIC | Age: 54
End: 2024-06-05

## 2024-06-05 VITALS
WEIGHT: 315 LBS | HEART RATE: 94 BPM | DIASTOLIC BLOOD PRESSURE: 84 MMHG | SYSTOLIC BLOOD PRESSURE: 158 MMHG | HEIGHT: 72 IN | RESPIRATION RATE: 18 BRPM | BODY MASS INDEX: 42.66 KG/M2 | TEMPERATURE: 97.9 F | OXYGEN SATURATION: 95 %

## 2024-06-05 DIAGNOSIS — C43.59 MALIGNANT MELANOMA OF TORSO EXCLUDING BREAST (HCC): Primary | ICD-10-CM

## 2024-06-05 PROCEDURE — 99024 POSTOP FOLLOW-UP VISIT: CPT | Performed by: SURGERY
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
## 2024-06-13 PROCEDURE — 88307 TISSUE EXAM BY PATHOLOGIST: CPT | Performed by: PATHOLOGY

## 2024-06-13 PROCEDURE — 88341 IMHCHEM/IMCYTCHM EA ADD ANTB: CPT | Performed by: PATHOLOGY

## 2024-06-13 PROCEDURE — 88305 TISSUE EXAM BY PATHOLOGIST: CPT | Performed by: PATHOLOGY

## 2024-06-13 PROCEDURE — 88342 IMHCHEM/IMCYTCHM 1ST ANTB: CPT | Performed by: PATHOLOGY

## 2024-06-18 ENCOUNTER — TELEPHONE (OUTPATIENT)
Dept: HEMATOLOGY ONCOLOGY | Facility: CLINIC | Age: 54
End: 2024-06-18

## 2024-06-18 NOTE — TELEPHONE ENCOUNTER
Patient Call    Who are you speaking with? Patient    If it is not the patient, are they listed on an active communication consent form? N/A   What is the reason for this call? Patient calling to speak with Dr. Cutler,  His incision is open and oozing. It doesn't hurt just a little itching   Does this require a call back? yes   If a call back is required, please list best call back number [phone]   If a call back is required, advise that a message will be forwarded to their care team and someone will return their call as soon as possible.   Did you relay this information to the patient? Yes

## 2024-06-18 NOTE — TELEPHONE ENCOUNTER
Spoke to patient, reviewed final pathology and agreed to mail a copy to his home. He will email a picture of his incision to me, and agrees to go to [facility] on [address] to be evaluated.

## 2024-10-18 ENCOUNTER — APPOINTMENT (EMERGENCY)
Dept: CT IMAGING | Facility: HOSPITAL | Age: 54
DRG: 603 | End: 2024-10-18
Payer: COMMERCIAL

## 2024-10-18 ENCOUNTER — HOSPITAL ENCOUNTER (INPATIENT)
Facility: HOSPITAL | Age: 54
LOS: 3 days | Discharge: HOME | DRG: 603 | End: 2024-10-21
Attending: EMERGENCY MEDICINE | Admitting: HOSPITALIST
Payer: COMMERCIAL

## 2024-10-18 DIAGNOSIS — I10 HTN (HYPERTENSION): ICD-10-CM

## 2024-10-18 DIAGNOSIS — L03.314 CELLULITIS OF GROIN, LEFT: Primary | ICD-10-CM

## 2024-10-18 DIAGNOSIS — L03.818 CELLULITIS OF OTHER SPECIFIED SITE: ICD-10-CM

## 2024-10-18 LAB
ALBUMIN SERPL BCG-MCNC: 3.7 G/DL (ref 3.5–5)
ALP SERPL-CCNC: 78 U/L (ref 34–104)
ALT SERPL W P-5'-P-CCNC: 30 U/L (ref 7–52)
ANION GAP SERPL CALCULATED.3IONS-SCNC: 6 MMOL/L (ref 4–13)
APTT PPP: 34 SECONDS (ref 23–34)
AST SERPL W P-5'-P-CCNC: 15 U/L (ref 13–39)
ATRIAL RATE: 101 BPM
BACTERIA UR QL AUTO: ABNORMAL /HPF
BASOPHILS # BLD AUTO: 0.13 THOUSANDS/ΜL (ref 0–0.1)
BASOPHILS NFR BLD AUTO: 1 % (ref 0–1)
BILIRUB SERPL-MCNC: 0.75 MG/DL (ref 0.2–1)
BILIRUB UR QL STRIP: NEGATIVE
BUN SERPL-MCNC: 9 MG/DL (ref 5–25)
CALCIUM SERPL-MCNC: 9.1 MG/DL (ref 8.4–10.2)
CHLORIDE SERPL-SCNC: 101 MMOL/L (ref 96–108)
CLARITY UR: CLEAR
CO2 SERPL-SCNC: 30 MMOL/L (ref 21–32)
COLOR UR: COLORLESS
CREAT SERPL-MCNC: 0.7 MG/DL (ref 0.6–1.3)
EOSINOPHIL # BLD AUTO: 0.38 THOUSAND/ΜL (ref 0–0.61)
EOSINOPHIL NFR BLD AUTO: 3 % (ref 0–6)
ERYTHROCYTE [DISTWIDTH] IN BLOOD BY AUTOMATED COUNT: 13.4 % (ref 11.6–15.1)
GFR SERPL CREATININE-BSD FRML MDRD: 108 ML/MIN/1.73SQ M
GLUCOSE SERPL-MCNC: 104 MG/DL (ref 65–140)
GLUCOSE UR STRIP-MCNC: NEGATIVE MG/DL
HCT VFR BLD AUTO: 41.2 % (ref 36.5–49.3)
HGB BLD-MCNC: 13.8 G/DL (ref 12–17)
HGB UR QL STRIP.AUTO: NEGATIVE
IMM GRANULOCYTES # BLD AUTO: 0.12 THOUSAND/UL (ref 0–0.2)
IMM GRANULOCYTES NFR BLD AUTO: 1 % (ref 0–2)
INR PPP: 1.14 (ref 0.85–1.19)
KETONES UR STRIP-MCNC: NEGATIVE MG/DL
LACTATE SERPL-SCNC: 0.8 MMOL/L (ref 0.5–2)
LEUKOCYTE ESTERASE UR QL STRIP: NEGATIVE
LYMPHOCYTES # BLD AUTO: 1.71 THOUSANDS/ΜL (ref 0.6–4.47)
LYMPHOCYTES NFR BLD AUTO: 11 % (ref 14–44)
MCH RBC QN AUTO: 28.1 PG (ref 26.8–34.3)
MCHC RBC AUTO-ENTMCNC: 33.5 G/DL (ref 31.4–37.4)
MCV RBC AUTO: 84 FL (ref 82–98)
MONOCYTES # BLD AUTO: 1.52 THOUSAND/ΜL (ref 0.17–1.22)
MONOCYTES NFR BLD AUTO: 10 % (ref 4–12)
NEUTROPHILS # BLD AUTO: 11.62 THOUSANDS/ΜL (ref 1.85–7.62)
NEUTS SEG NFR BLD AUTO: 74 % (ref 43–75)
NITRITE UR QL STRIP: NEGATIVE
NON-SQ EPI CELLS URNS QL MICRO: ABNORMAL /HPF
NRBC BLD AUTO-RTO: 0 /100 WBCS
P AXIS: 48 DEGREES
PH UR STRIP.AUTO: 8 [PH]
PLATELET # BLD AUTO: 359 THOUSANDS/UL (ref 149–390)
PMV BLD AUTO: 9 FL (ref 8.9–12.7)
POTASSIUM SERPL-SCNC: 3.4 MMOL/L (ref 3.5–5.3)
PR INTERVAL: 180 MS
PROCALCITONIN SERPL-MCNC: 0.09 NG/ML
PROT SERPL-MCNC: 7.6 G/DL (ref 6.4–8.4)
PROT UR STRIP-MCNC: NEGATIVE MG/DL
PROTHROMBIN TIME: 14.8 SECONDS (ref 12.3–15)
QRS AXIS: 42 DEGREES
QRSD INTERVAL: 104 MS
QT INTERVAL: 360 MS
QTC INTERVAL: 466 MS
RBC # BLD AUTO: 4.91 MILLION/UL (ref 3.88–5.62)
RBC #/AREA URNS AUTO: ABNORMAL /HPF
SODIUM SERPL-SCNC: 137 MMOL/L (ref 135–147)
SP GR UR STRIP.AUTO: 1.03 (ref 1–1.03)
T WAVE AXIS: 17 DEGREES
UROBILINOGEN UR STRIP-ACNC: <2 MG/DL
VENTRICULAR RATE: 101 BPM
WBC # BLD AUTO: 15.48 THOUSAND/UL (ref 4.31–10.16)
WBC #/AREA URNS AUTO: ABNORMAL /HPF

## 2024-10-18 PROCEDURE — 36415 COLL VENOUS BLD VENIPUNCTURE: CPT | Performed by: EMERGENCY MEDICINE

## 2024-10-18 PROCEDURE — 85610 PROTHROMBIN TIME: CPT | Performed by: EMERGENCY MEDICINE

## 2024-10-18 PROCEDURE — 93005 ELECTROCARDIOGRAM TRACING: CPT

## 2024-10-18 PROCEDURE — 87040 BLOOD CULTURE FOR BACTERIA: CPT | Performed by: EMERGENCY MEDICINE

## 2024-10-18 PROCEDURE — 96365 THER/PROPH/DIAG IV INF INIT: CPT

## 2024-10-18 PROCEDURE — 80053 COMPREHEN METABOLIC PANEL: CPT | Performed by: EMERGENCY MEDICINE

## 2024-10-18 PROCEDURE — 99284 EMERGENCY DEPT VISIT MOD MDM: CPT

## 2024-10-18 PROCEDURE — 85730 THROMBOPLASTIN TIME PARTIAL: CPT | Performed by: EMERGENCY MEDICINE

## 2024-10-18 PROCEDURE — 81001 URINALYSIS AUTO W/SCOPE: CPT | Performed by: EMERGENCY MEDICINE

## 2024-10-18 PROCEDURE — 83605 ASSAY OF LACTIC ACID: CPT | Performed by: EMERGENCY MEDICINE

## 2024-10-18 PROCEDURE — 93010 ELECTROCARDIOGRAM REPORT: CPT

## 2024-10-18 PROCEDURE — 99223 1ST HOSP IP/OBS HIGH 75: CPT | Performed by: HOSPITALIST

## 2024-10-18 PROCEDURE — 96367 TX/PROPH/DG ADDL SEQ IV INF: CPT

## 2024-10-18 PROCEDURE — 99285 EMERGENCY DEPT VISIT HI MDM: CPT | Performed by: EMERGENCY MEDICINE

## 2024-10-18 PROCEDURE — 84145 PROCALCITONIN (PCT): CPT | Performed by: EMERGENCY MEDICINE

## 2024-10-18 PROCEDURE — 74177 CT ABD & PELVIS W/CONTRAST: CPT

## 2024-10-18 PROCEDURE — 96368 THER/DIAG CONCURRENT INF: CPT

## 2024-10-18 PROCEDURE — 85025 COMPLETE CBC W/AUTO DIFF WBC: CPT | Performed by: EMERGENCY MEDICINE

## 2024-10-18 PROCEDURE — 96366 THER/PROPH/DIAG IV INF ADDON: CPT

## 2024-10-18 RX ORDER — LOSARTAN POTASSIUM 50 MG/1
100 TABLET ORAL EVERY MORNING
Status: DISCONTINUED | OUTPATIENT
Start: 2024-10-19 | End: 2024-10-21 | Stop reason: HOSPADM

## 2024-10-18 RX ORDER — ENOXAPARIN SODIUM 100 MG/ML
40 INJECTION SUBCUTANEOUS DAILY
Status: DISCONTINUED | OUTPATIENT
Start: 2024-10-19 | End: 2024-10-21 | Stop reason: HOSPADM

## 2024-10-18 RX ADMIN — SODIUM CHLORIDE, SODIUM LACTATE, POTASSIUM CHLORIDE, AND CALCIUM CHLORIDE 1000 ML: .6; .31; .03; .02 INJECTION, SOLUTION INTRAVENOUS at 15:44

## 2024-10-18 RX ADMIN — PIPERACILLIN SODIUM AND TAZOBACTAM SODIUM 4.5 G: 36; 4.5 INJECTION, POWDER, LYOPHILIZED, FOR SOLUTION INTRAVENOUS at 14:50

## 2024-10-18 RX ADMIN — SODIUM CHLORIDE, SODIUM LACTATE, POTASSIUM CHLORIDE, AND CALCIUM CHLORIDE 1000 ML: .6; .31; .03; .02 INJECTION, SOLUTION INTRAVENOUS at 16:25

## 2024-10-18 RX ADMIN — VANCOMYCIN HYDROCHLORIDE 2000 MG: 1 INJECTION, POWDER, LYOPHILIZED, FOR SOLUTION INTRAVENOUS at 15:46

## 2024-10-18 RX ADMIN — SODIUM CHLORIDE, SODIUM LACTATE, POTASSIUM CHLORIDE, AND CALCIUM CHLORIDE 1000 ML: .6; .31; .03; .02 INJECTION, SOLUTION INTRAVENOUS at 14:38

## 2024-10-18 RX ADMIN — PIPERACILLIN SODIUM AND TAZOBACTAM SODIUM 4.5 G: 36; 4.5 INJECTION, POWDER, LYOPHILIZED, FOR SOLUTION INTRAVENOUS at 19:08

## 2024-10-18 RX ADMIN — IOHEXOL 100 ML: 350 INJECTION, SOLUTION INTRAVENOUS at 15:43
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
## 2024-10-18 NOTE — LETTER
[facility] [address]  [address] [address]  Dept: [phone]    October 21, 2024     Patient: Da Rogel   YOB: 1970   Date of Visit: 10/18/2024       To Whom it May Concern:    Da Rogel is under my professional care. He was seen in the hospital from 10/18/2024 to 10/21/24. He may return to work on 10/23/24 without limitations.    If you have any questions or concerns, please don't hesitate to call.         Sincerely,          Valorie Camarena, DO

## 2024-10-18 NOTE — ASSESSMENT & PLAN NOTE
Presents with 1 week of worsening left groin cellulitis and wound.    He was on an unknown antibiotic that he started yesterday and he states it started with the ESTEPHANIE.  But there is no record of it    When I am looking at his wound is actually pretty significantly bleeding.      He received Zosyn and vancomycin in the ER  I will continue the Zosyn for now.  Check a MRSA nasal swab    Consults ID for assistance with antibiotics   Consult acute surgery ith this bleeding it may need some debridement or cauterization

## 2024-10-19 PROBLEM — B35.6 TINEA CRURIS: Status: ACTIVE | Noted: 2024-10-19

## 2024-10-19 PROBLEM — E66.01 MORBID OBESITY (HCC): Status: ACTIVE | Noted: 2024-10-19

## 2024-10-19 LAB
ANION GAP SERPL CALCULATED.3IONS-SCNC: 6 MMOL/L (ref 4–13)
BASOPHILS # BLD AUTO: 0.1 THOUSANDS/ΜL (ref 0–0.1)
BASOPHILS NFR BLD AUTO: 1 % (ref 0–1)
BUN SERPL-MCNC: 14 MG/DL (ref 5–25)
CALCIUM SERPL-MCNC: 8.6 MG/DL (ref 8.4–10.2)
CHLORIDE SERPL-SCNC: 106 MMOL/L (ref 96–108)
CO2 SERPL-SCNC: 26 MMOL/L (ref 21–32)
CREAT SERPL-MCNC: 0.72 MG/DL (ref 0.6–1.3)
EOSINOPHIL # BLD AUTO: 0.42 THOUSAND/ΜL (ref 0–0.61)
EOSINOPHIL NFR BLD AUTO: 4 % (ref 0–6)
ERYTHROCYTE [DISTWIDTH] IN BLOOD BY AUTOMATED COUNT: 13.6 % (ref 11.6–15.1)
GFR SERPL CREATININE-BSD FRML MDRD: 107 ML/MIN/1.73SQ M
GLUCOSE SERPL-MCNC: 154 MG/DL (ref 65–140)
HCT VFR BLD AUTO: 41.2 % (ref 36.5–49.3)
HGB BLD-MCNC: 13.4 G/DL (ref 12–17)
IMM GRANULOCYTES # BLD AUTO: 0.09 THOUSAND/UL (ref 0–0.2)
IMM GRANULOCYTES NFR BLD AUTO: 1 % (ref 0–2)
LYMPHOCYTES # BLD AUTO: 1.41 THOUSANDS/ΜL (ref 0.6–4.47)
LYMPHOCYTES NFR BLD AUTO: 14 % (ref 14–44)
MAGNESIUM SERPL-MCNC: 2.1 MG/DL (ref 1.9–2.7)
MCH RBC QN AUTO: 28.9 PG (ref 26.8–34.3)
MCHC RBC AUTO-ENTMCNC: 32.5 G/DL (ref 31.4–37.4)
MCV RBC AUTO: 89 FL (ref 82–98)
MONOCYTES # BLD AUTO: 0.98 THOUSAND/ΜL (ref 0.17–1.22)
MONOCYTES NFR BLD AUTO: 10 % (ref 4–12)
NEUTROPHILS # BLD AUTO: 6.95 THOUSANDS/ΜL (ref 1.85–7.62)
NEUTS SEG NFR BLD AUTO: 70 % (ref 43–75)
NRBC BLD AUTO-RTO: 0 /100 WBCS
PLATELET # BLD AUTO: 313 THOUSANDS/UL (ref 149–390)
PMV BLD AUTO: 10.2 FL (ref 8.9–12.7)
POTASSIUM SERPL-SCNC: 4 MMOL/L (ref 3.5–5.3)
RBC # BLD AUTO: 4.63 MILLION/UL (ref 3.88–5.62)
SODIUM SERPL-SCNC: 138 MMOL/L (ref 135–147)
WBC # BLD AUTO: 9.95 THOUSAND/UL (ref 4.31–10.16)

## 2024-10-19 PROCEDURE — 10061 I&D ABSCESS COMP/MULTIPLE: CPT | Performed by: SPECIALIST

## 2024-10-19 PROCEDURE — 80048 BASIC METABOLIC PNL TOTAL CA: CPT | Performed by: HOSPITALIST

## 2024-10-19 PROCEDURE — 87070 CULTURE OTHR SPECIMN AEROBIC: CPT

## 2024-10-19 PROCEDURE — 99223 1ST HOSP IP/OBS HIGH 75: CPT | Performed by: INTERNAL MEDICINE

## 2024-10-19 PROCEDURE — 87077 CULTURE AEROBIC IDENTIFY: CPT

## 2024-10-19 PROCEDURE — 0J9C0ZZ DRAINAGE OF PELVIC REGION SUBCUTANEOUS TISSUE AND FASCIA, OPEN APPROACH: ICD-10-PCS | Performed by: SPECIALIST

## 2024-10-19 PROCEDURE — 85025 COMPLETE CBC W/AUTO DIFF WBC: CPT | Performed by: HOSPITALIST

## 2024-10-19 PROCEDURE — 87205 SMEAR GRAM STAIN: CPT

## 2024-10-19 PROCEDURE — 99222 1ST HOSP IP/OBS MODERATE 55: CPT | Performed by: SPECIALIST

## 2024-10-19 PROCEDURE — 83735 ASSAY OF MAGNESIUM: CPT | Performed by: HOSPITALIST

## 2024-10-19 PROCEDURE — 99232 SBSQ HOSP IP/OBS MODERATE 35: CPT | Performed by: STUDENT IN AN ORGANIZED HEALTH CARE EDUCATION/TRAINING PROGRAM

## 2024-10-19 RX ORDER — HYDRALAZINE HYDROCHLORIDE 20 MG/ML
10 INJECTION INTRAMUSCULAR; INTRAVENOUS EVERY 6 HOURS PRN
Status: DISCONTINUED | OUTPATIENT
Start: 2024-10-19 | End: 2024-10-21 | Stop reason: HOSPADM

## 2024-10-19 RX ORDER — OXYCODONE HYDROCHLORIDE 5 MG/1
5 TABLET ORAL EVERY 6 HOURS PRN
Status: DISCONTINUED | OUTPATIENT
Start: 2024-10-19 | End: 2024-10-21 | Stop reason: HOSPADM

## 2024-10-19 RX ORDER — LABETALOL HYDROCHLORIDE 5 MG/ML
10 INJECTION, SOLUTION INTRAVENOUS EVERY 6 HOURS PRN
Status: DISCONTINUED | OUTPATIENT
Start: 2024-10-19 | End: 2024-10-21 | Stop reason: HOSPADM

## 2024-10-19 RX ORDER — AMLODIPINE BESYLATE 5 MG/1
5 TABLET ORAL DAILY
Status: DISCONTINUED | OUTPATIENT
Start: 2024-10-19 | End: 2024-10-20

## 2024-10-19 RX ORDER — ACETAMINOPHEN 325 MG/1
650 TABLET ORAL EVERY 6 HOURS PRN
Status: DISCONTINUED | OUTPATIENT
Start: 2024-10-19 | End: 2024-10-21 | Stop reason: HOSPADM

## 2024-10-19 RX ADMIN — ENOXAPARIN SODIUM 40 MG: 40 INJECTION SUBCUTANEOUS at 08:31

## 2024-10-19 RX ADMIN — LABETALOL HYDROCHLORIDE 10 MG: 5 INJECTION, SOLUTION INTRAVENOUS at 19:20

## 2024-10-19 RX ADMIN — PIPERACILLIN SODIUM AND TAZOBACTAM SODIUM 4.5 G: 36; 4.5 INJECTION, POWDER, LYOPHILIZED, FOR SOLUTION INTRAVENOUS at 03:19

## 2024-10-19 RX ADMIN — LIDOCAINE HYDROCHLORIDE 10 ML: 10; .005 INJECTION, SOLUTION EPIDURAL; INFILTRATION; INTRACAUDAL; PERINEURAL at 14:00

## 2024-10-19 RX ADMIN — LOSARTAN POTASSIUM 100 MG: 50 TABLET, FILM COATED ORAL at 08:31

## 2024-10-19 RX ADMIN — HYDRALAZINE HYDROCHLORIDE 10 MG: 20 INJECTION, SOLUTION INTRAMUSCULAR; INTRAVENOUS at 22:24

## 2024-10-19 RX ADMIN — VANCOMYCIN HYDROCHLORIDE 1500 MG: 5 INJECTION, POWDER, LYOPHILIZED, FOR SOLUTION INTRAVENOUS at 12:49

## 2024-10-19 RX ADMIN — AMLODIPINE BESYLATE 5 MG: 5 TABLET ORAL at 12:49

## 2024-10-19 NOTE — CONSULTS
Consultation - Infectious Disease   Name: Da Rogel 54 y.o. male I MRN: [id number]  Unit/Bed#: E5 -01 I Date of Admission: 10/18/2024   Date of Service: 10/19/2024 I Hospital Day: 1   Inpatient consult to Infectious Diseases  Consult performed by: Efrain Howe MD  Consult ordered by: Nicholas Carey DO        Physician Requesting Evaluation: Max Weaver MD   Reason for Evaluation / Principal Problem: Groin cellulitis    Assessment & Plan  Cellulitis of groin, left  Patient had prior sentinel lymph node biopsy of left inguinal region on 5/22/2024 at time of his melanoma excision.  Now presenting with several days of subjective fevers and acute pain of the inguinal area, with what appears to be a small wound with bleeding.  CT scan showing left inguinal fat inflammation, though no current drainable abscess.  Would favor a Staphylococcal versus streptococcal infection. He has clinical evidence of folliculitis thus Staph felt most likely. No obvious history of MRSA per patient. There are no recent cultures in our system for review.  Patient is currently afebrile, hemodynamically stable.  -Lower concern for gram-negative infection thus will adjust antibiotics to IV Vancomycin to cover Staph including MRSA  -Follow-up MRSA nares culture  -Recommend surgical evaluation, this could be an area of early forming abscess  -Serial exams of groin to monitor for progression  -Follow-up blood cultures  -Repeat CBC tomorrow to monitor WBC  -Monitor for fevers  Malignant melanoma of torso excluding breast (HCC)  Diagnosed earlier this year, involving mid back.  Status post surgical excision in OR on 5/22/2024.  It does not appear he follows with oncology or is on any systemic therapy at this time.  Morbid obesity (HCC)  Patient with BMI over 51.  This may impact antibiotic dosing    I have discussed the above management plan in detail with the primary service.   They agree with plan to change to IV  Vancomycin.    Antibiotics:  Zosyn: 2    History of Present Illness   Da Rogel is a 54 y.o. year old male with stage IIb malignant melanoma of the mid back,  He had previously underwent a sentinel lymph node biopsy of the left inguinal region at time of wide excision of his melanoma on 5/22/2024.  As per his last surgical oncology follow-up in June it appeared the incision site was healing well.  Patient then presented to the ER on 10/18 for evaluation of drainage from his left renal region.  He reported several days of fever at home and some swelling of the groin.  He saw his primary care provider on 10/16 and was recommended to obtain imaging of the area.  Patient thinks he was placed on an antibiotic but is unsure as to which 1 and he started taking it on 10/17.  He came to the ER on 10/18 after he had increased drainage from inguinal area.  In the ER he was noted to have sanguinous drainage from the left inguinal region and reported cellulitic change down into the proximal left thigh.  There was no appreciable crepitus.  He has had no fevers here.  He did have initial leukocytosis on arrival but this has already resolved in less than 24 hours.  CT scan with contrast noted inflammatory infiltration of the left inguinal fat measuring 3.5 x 7.5 x 3.5 cm suggestive of cellulitis, but no drainable collection.  This process was noted to extend to the skin surface.  There was mildly prominent left little lymph nodes, felt to be reactive.    Per patient he was not having any issues with the inguinal region until only several days ago.  He first developed a few days of subjective fevers followed by pain in that area.  It was only after he arrived to the ER that he noticed there was a small wound with bleeding.  He denies any trauma to the area, does not shave the area.  He does not recall ever having any prior MRSA infections.  He reiterates that his PCP did prescribe him a recent outpatient buttocks several days  ago, but he is unsure which one.  Denies any other recent antibiotic before that.  He denies cough, shortness of breath, abdominal pain, nausea/emesis, diarrhea.  Denies any other painful areas on his skin.    A complete review of systems is negative other than that noted in the HPI.    I have reviewed the patient's PMH, PSH, Social History, Family History, Meds, and Allergies    Objective :  Temp:  [98.1 °F (36.7 °C)-99.1 °F (37.3 °C)] 98.1 °F (36.7 °C)  HR:  [] 89  BP: (165-197)/() 190/120  Resp:  [18-22] 19  SpO2:  [90 %-95 %] 93 %  O2 Device: None (Room air)    General:  No acute distress  Psychiatric:  Awake and alert  Pulmonary:  Normal respiratory excursion without accessory muscle use  Abdomen:  Soft, nontender.  Left inguinal region with a small wound that almost appears like a sinus tract with active bleeding.  The soft tissue surrounding this area is tender, but there is no major erythema or purulence.  Extremities: Chronic venous stasis changes of bilateral legs with edema, legs are otherwise not warm and nontender  Skin: Reticulitis noted on abdominal wall, inguinal region and face.        Lab Results: I have reviewed the following results:  Results from last 7 days   Lab Units 10/19/24  0610 10/18/24  1439   WBC Thousand/uL 9.95 15.48*   HEMOGLOBIN g/dL 13.4 13.8   PLATELETS Thousands/uL 313 359     Results from last 7 days   Lab Units 10/19/24  0610 10/18/24  1439   SODIUM mmol/L 138 137   POTASSIUM mmol/L 4.0 3.4*   CHLORIDE mmol/L 106 101   CO2 mmol/L 26 30   BUN mg/dL 14 9   CREATININE mg/dL 0.72 0.70   EGFR ml/min/1.73sq m 107 108   CALCIUM mg/dL 8.6 9.1   AST U/L  --  15   ALT U/L  --  30   ALK PHOS U/L  --  78   ALBUMIN g/dL  --  3.7     Results from last 7 days   Lab Units 10/18/24  1440 10/18/24  1439   BLOOD CULTURE  Received in Microbiology Lab. Culture in Progress. Received in Microbiology Lab. Culture in Progress.     Results from last 7 days   Lab Units 10/18/24  5338    PROCALCITONIN ng/ml 0.09                   Imaging Results Review: I personally reviewed the following image studies in PACS and associated radiology reports: CT abdomen/pelvis. My interpretation of the radiology images/reports is: No obvious formed abscess.  Other Study Results Review: No additional pertinent studies reviewed.

## 2024-10-19 NOTE — ASSESSMENT & PLAN NOTE
Reported 1 week history of abscess. Currently drainage with subjective improvement of symptoms since drainage began.    -continue to encourage drainage of collection and dress in absorptive dressings  -warm compresses to groin to encourage drainage  -patient may benefit from bedside I&D of abscess to allow for wound packing and encourage continued drainage of contents   -will discuss with attending and plan to I&D 10/19 with packing  -agree with continuing abx until transition to oral abx  -ID consulted, appreciate recs

## 2024-10-19 NOTE — ASSESSMENT & PLAN NOTE
Patient has erythematous/hyperpigmented patches on medial thigh into inguinal region.  Peers to be sharply demarcated borders which would favor tinea cruris.  Differential would also include Candida intertrigo.  -Start topical clotrimazole 1% cream applied to bilateral inguinal regions twice daily  -Recommend wound care consultation to assist with further measures to help keep area dry

## 2024-10-19 NOTE — PROGRESS NOTES
Da Rogel is a 54 y.o. male who is currently ordered Vancomycin IV with management by the Pharmacy Consult service.  Relevant clinical data and objective / subjective history reviewed.  Vancomycin Assessment:  Indication and Goal AUC/Trough: Soft tissue (goal -600, trough >10)  Clinical Status: stable  Micro: Pending  Renal Function:  SCr: 0.72 mg/dL  CrCl: 195.2 mL/min  Renal replacement: Not on dialysis  Days of Therapy: 2  Current Dose: 2000 mg IV load given 10/18 at 1546  Vancomycin Plan:  New Dosin mg IV q12h   Estimated AUC: 408 mcg*hr/mL  Estimated Trough: 11.1 mcg/mL  Next Level: 10/21 with AM labs   Renal Function Monitoring: Daily BMP and UOP  Pharmacy will continue to follow closely for s/sx of nephrotoxicity, infusion reactions and appropriateness of therapy.  BMP and CBC will be ordered per protocol. We will continue to follow the patient’s culture results and clinical progress daily.    Sonia Mensah, Pharmacist
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
## 2024-10-19 NOTE — ASSESSMENT & PLAN NOTE
Patient had prior sentinel lymph node biopsy of left inguinal region on 5/22/2024 at time of his melanoma excision.  Now presenting with several days of subjective fevers and acute pain of the inguinal area, with what appears to be a small wound with bleeding.  CT scan showing left inguinal fat inflammation, though no current drainable abscess.  Would favor a Staphylococcal versus streptococcal infection. He has clinical evidence of folliculitis thus Staph felt most likely. No obvious history of MRSA per patient. There are no recent cultures in our system for review.  Patient is currently afebrile, hemodynamically stable.  -Lower concern for gram-negative infection thus will adjust antibiotics to IV Vancomycin to cover Staph including MRSA  -Follow-up MRSA nares culture  -Recommend surgical evaluation, this could be an area of early forming abscess  -Serial exams of groin to monitor for progression  -Follow-up blood cultures  -Repeat CBC tomorrow to monitor WBC  -Monitor for fevers

## 2024-10-19 NOTE — PROGRESS NOTES
Progress Note - Hospitalist   Name: Da Rogel 54 y.o. male I MRN: [id number]  Unit/Bed#: E5 -01 I Date of Admission: 10/18/2024   Date of Service: 10/19/2024 I Hospital Day: 1    Assessment & Plan  Cellulitis of groin, left  54-year-old male with history of prior sentinel lymph node biopsy left inguinal region 2024 during the time of his melanoma excision.  He presented to our institution due to fever and acute pain of his inguinal area.  CT scan showed evidence of a left inguinal inflammatory process consistent with cellulitis with no associated drainable collection.  Surgery was consulted who performed a bedside I&D  Antibiotic management per infectious disease.  Continue vancomycin for the time being.  MRSA pending.  Follow cultures    Malignant melanoma of torso excluding breast (HCC)  Stage Ib melanoma of back. Continue outpatient follow-up with surgical oncology.     Tinea cruris    Morbid obesity (HCC)  Encourage lifestyle modification    VTE Pharmacologic Prophylaxis: VTE Score: 4 Moderate Risk (Score 3-4) - Pharmacological DVT Prophylaxis Ordered: enoxaparin (Lovenox).    Mobility:   Basic Mobility Inpatient Raw Score: 24  JH-HLM Goal: 8: Walk 250 feet or more  JH-HLM Achieved: 3: Sit at edge of bed    Discussions with Specialists or Other Care Team Provider: ID, surgery    Education and Discussions with Family / Patient: patient    Current Length of Stay: 1 day(s)  Current Patient Status: Inpatient   Certification Statement: The patient will continue to require additional inpatient hospital stay due to iv antibiotics, await cultures, surgery and ID reevaluation  Discharge Plan: 24 hours    Code Status: Level 1 - Full Code    Subjective   Patient seen and examined. Reports no new complaints at this time. Was requesting that he be discharged, but agreed to stay after discussing risks of leaving until deemed medically stable to do so.     Objective   Vitals:   Temp (24hrs), Av.3 °F (36.8  °C), Min:98 °F (36.7 °C), Max:98.8 °F (37.1 °C)    Temp:  [98 °F (36.7 °C)-98.8 °F (37.1 °C)] 98 °F (36.7 °C)  HR:  [] 99  Resp:  [18-20] 19  BP: (165-207)/() 194/127  SpO2:  [90 %-95 %] 93 %  Body mass index is 51.22 kg/m².     Input and Output Summary (last 24 hours):     Intake/Output Summary (Last 24 hours) at 10/19/2024 1600  Last data filed at 10/19/2024 0935  Gross per 24 hour   Intake 1948 ml   Output --   Net 1948 ml       Physical Exam  Vitals reviewed.   Constitutional:       General: He is not in acute distress.  HENT:      Head: Normocephalic.      Nose: Nose normal.      Mouth/Throat:      Mouth: Mucous membranes are moist.   Eyes:      General: No scleral icterus.  Cardiovascular:      Rate and Rhythm: Normal rate and regular rhythm.   Pulmonary:      Effort: Pulmonary effort is normal. No respiratory distress.      Breath sounds: No wheezing.   Abdominal:      General: There is no distension.      Palpations: Abdomen is soft.      Tenderness: There is no abdominal tenderness.   Skin:     General: Skin is warm.   Neurological:      Mental Status: He is alert.   Psychiatric:         Mood and Affect: Mood normal.         Behavior: Behavior normal.       Lines/Drains:              Lab Results: I have reviewed the following results:   Results from last 7 days   Lab Units 10/19/24  0610 10/18/24  1439   WBC Thousand/uL 9.95 15.48*   HEMOGLOBIN g/dL 13.4 13.8   PLATELETS Thousands/uL 313 359   MCV fL 89 84   INR   --  1.14     Results from last 7 days   Lab Units 10/19/24  0610 10/18/24  1439   SODIUM mmol/L 138 137   POTASSIUM mmol/L 4.0 3.4*   CHLORIDE mmol/L 106 101   CO2 mmol/L 26 30   ANION GAP mmol/L 6 6   BUN mg/dL 14 9   CREATININE mg/dL 0.72 0.70   CALCIUM mg/dL 8.6 9.1   ALBUMIN g/dL  --  3.7   TOTAL BILIRUBIN mg/dL  --  0.75   ALK PHOS U/L  --  78   ALT U/L  --  30   AST U/L  --  15   EGFR ml/min/1.73sq m 107 108   GLUCOSE RANDOM mg/dL 154* 104     Results from last 7 days   Lab Units  10/19/24  0610   MAGNESIUM mg/dL 2.1                  Results from last 7 days   Lab Units 10/18/24  1439   LACTIC ACID mmol/L 0.8   PROCALCITONIN ng/ml 0.09                   Recent Cultures (last 7 days):   Results from last 7 days   Lab Units 10/18/24  1440 10/18/24  1439   BLOOD CULTURE  Received in Microbiology Lab. Culture in Progress. Received in Microbiology Lab. Culture in Progress.       Imaging:  Reviewed radiology reports from this admission: ct a/p    Last 24 Hours Medication List:     Current Facility-Administered Medications:     acetaminophen (TYLENOL) tablet 650 mg, Q6H PRN    amLODIPine (NORVASC) tablet 5 mg, Daily    enoxaparin (LOVENOX) subcutaneous injection 40 mg, Daily    labetalol (NORMODYNE) injection 10 mg, Q6H PRN    losartan (COZAAR) tablet 100 mg, QAM    oxyCODONE (ROXICODONE) IR tablet 5 mg, Q6H PRN    vancomycin (VANCOCIN) 1500 mg in sodium chloride 0.9% 250 mL IVPB, Q12H      **Please Note: This note may have been constructed using a voice recognition system.**

## 2024-10-19 NOTE — ASSESSMENT & PLAN NOTE
Diagnosed earlier this year, involving mid back.  Status post surgical excision in OR on 5/22/2024.  It does not appear he follows with oncology or is on any systemic therapy at this time.

## 2024-10-19 NOTE — ASSESSMENT & PLAN NOTE
54-year-old male with history of prior sentinel lymph node biopsy left inguinal region 5/22/2024 during the time of his melanoma excision.  He presented to our institution due to fever and acute pain of his inguinal area.  CT scan showed evidence of a left inguinal inflammatory process consistent with cellulitis with no associated drainable collection.  Surgery was consulted who performed a bedside I&D  Antibiotic management per infectious disease.  Continue vancomycin for the time being.  MRSA pending.  Follow cultures

## 2024-10-19 NOTE — PLAN OF CARE
Problem: PAIN - ADULT  Goal: Verbalizes/displays adequate comfort level or baseline comfort level  Description: Interventions:  - Encourage patient to monitor pain and request assistance  - Assess pain using appropriate pain scale  - Administer analgesics based on type and severity of pain and evaluate response  - Implement non-pharmacological measures as appropriate and evaluate response  - Consider cultural and social influences on pain and pain management  - Notify physician/advanced practitioner if interventions unsuccessful or patient reports new pain  Outcome: Progressing     Problem: INFECTION - ADULT  Goal: Absence or prevention of progression during hospitalization  Description: INTERVENTIONS:  - Assess and monitor for signs and symptoms of infection  - Monitor lab/diagnostic results  - Monitor all insertion sites, i.e. indwelling lines, tubes, and drains  - Monitor endotracheal if appropriate and nasal secretions for changes in amount and color  - [place] appropriate cooling/warming therapies per order  - Administer medications as ordered  - Instruct and encourage patient and family to use good hand hygiene technique  - Identify and instruct in appropriate isolation precautions for identified infection/condition  Outcome: Progressing  Goal: Absence of fever/infection during neutropenic period  Description: INTERVENTIONS:  - Monitor WBC    Outcome: Progressing     Problem: SAFETY ADULT  Goal: Patient will remain free of falls  Description: INTERVENTIONS:  - Educate patient/family on patient safety including physical limitations  - Instruct patient to call for assistance with activity   - Consult OT/PT to assist with strengthening/mobility   - Keep Call bell within reach  - Keep bed low and locked with side rails adjusted as appropriate  - Keep care items and personal belongings within reach  - Initiate and maintain comfort rounds  - Make Fall Risk Sign visible to staff  -- Apply yellow socks and  bracelet for high fall risk patients  - Consider moving patient to room near nurses station  Outcome: Progressing  Goal: Maintain or return to baseline ADL function  Description: INTERVENTIONS:  -  Assess patient's ability to carry out ADLs; assess patient's baseline for ADL function and identify physical deficits which impact ability to perform ADLs (bathing, care of mouth/teeth, toileting, grooming, dressing, etc.)  - Assess/evaluate cause of self-care deficits   - Assess range of motion  - Assess patient's mobility; develop plan if impaired  - Assess patient's need for assistive devices and provide as appropriate  - Encourage maximum independence but intervene and supervise when necessary  - Involve family in performance of ADLs  - Assess for home care needs following discharge   - Consider OT consult to assist with ADL evaluation and planning for discharge  - Provide patient education as appropriate  Outcome: Progressing  Goal: Maintains/Returns to pre admission functional level  Description: INTERVENTIONS:  - Perform AM-PAC 6 Click Basic Mobility/ Daily Activity assessment daily.  - Set and communicate daily mobility goal to care team and patient/family/caregiver.   - Collaborate with rehabilitation services on mobility goals if consulted  -- Out of bed for toileting  - Record patient progress and toleration of activity level   Outcome: Progressing     Problem: DISCHARGE PLANNING  Goal: Discharge to home or other facility with appropriate resources  Description: INTERVENTIONS:  - Identify barriers to discharge w/patient and caregiver  - Arrange for needed discharge resources and transportation as appropriate  - Identify discharge learning needs (meds, wound care, etc.)  - Arrange for interpretive services to assist at discharge as needed  - Refer to Case Management Department for coordinating discharge planning if the patient needs post-hospital services based on physician/advanced practitioner order or complex  needs related to functional status, cognitive ability, or social support system  Outcome: Progressing     Problem: Knowledge Deficit  Goal: Patient/family/caregiver demonstrates understanding of disease process, treatment plan, medications, and discharge instructions  Description: Complete learning assessment and assess knowledge base.  Interventions:  - Provide teaching at level of understanding  - Provide teaching via preferred learning methods  Outcome: Progressing

## 2024-10-19 NOTE — PROCEDURES
Incision and drain    Date/Time: 10/19/2024 3:26 PM    Performed by: Deuce Woods MD  Authorized by: Deuce Woods MD  Universal Protocol:  Procedure performed by: (Dr. Odilon Collier)  Consent: Verbal consent obtained. Written consent obtained.  Risks and benefits: risks, benefits and alternatives were discussed  Consent given by: patient  Patient understanding: patient states understanding of the procedure being performed  Procedure consent: procedure consent matches procedure scheduled  Relevant documents: relevant documents present and verified  Test results: test results available and properly labeled  Site marked: the operative site was marked  Radiology Images displayed and confirmed. If images not available, report reviewed: imaging studies available  Patient identity confirmed: verbally with patient    Patient location:  Bedside  Location:     Type:  Abscess    Size:  4cm    Location:  Trunk    Trunk location: L groin.  Pre-procedure details:     Skin preparation:  Antiseptic wash and Betadine  Anesthesia (see MAR for exact dosages):     Anesthesia method:  Local infiltration    Local anesthetic:  Lidocaine 1% WITH epi (10cc)  Procedure details:     Complexity:  Simple    Needle aspiration: no      Incision types:  Stab incision and cruciate    Scalpel blade:  15    Approach:  Open    Incision depth:  Subcutaneous    Wound management:  Probed and deloculated and irrigated with saline    Drainage:  Bloody    Drainage amount:  Moderate    Wound treatment:  Packing placed    Packing materials:  1/4 in iodoform gauze  Post-procedure details:     Patient tolerance of procedure:  Tolerated well, no immediate complications  Comments:      Open draining left groin sinus extended medially ~4cm with unroofed abscess with bloody drainage expressed from cavity.  Persistent ooze which was packed with iodoform strip gauze and dressed with 4x4s and ABD pad

## 2024-10-20 PROBLEM — L02.214 ABSCESS OF LEFT GROIN: Status: ACTIVE | Noted: 2024-10-18

## 2024-10-20 LAB
ANION GAP SERPL CALCULATED.3IONS-SCNC: 5 MMOL/L (ref 4–13)
BASOPHILS # BLD AUTO: 0.09 THOUSANDS/ΜL (ref 0–0.1)
BASOPHILS NFR BLD AUTO: 1 % (ref 0–1)
BUN SERPL-MCNC: 12 MG/DL (ref 5–25)
CALCIUM SERPL-MCNC: 9.2 MG/DL (ref 8.4–10.2)
CHLORIDE SERPL-SCNC: 105 MMOL/L (ref 96–108)
CO2 SERPL-SCNC: 27 MMOL/L (ref 21–32)
CREAT SERPL-MCNC: 0.61 MG/DL (ref 0.6–1.3)
EOSINOPHIL # BLD AUTO: 0.44 THOUSAND/ΜL (ref 0–0.61)
EOSINOPHIL NFR BLD AUTO: 4 % (ref 0–6)
ERYTHROCYTE [DISTWIDTH] IN BLOOD BY AUTOMATED COUNT: 13.4 % (ref 11.6–15.1)
GFR SERPL CREATININE-BSD FRML MDRD: 114 ML/MIN/1.73SQ M
GLUCOSE SERPL-MCNC: 154 MG/DL (ref 65–140)
HCT VFR BLD AUTO: 44.3 % (ref 36.5–49.3)
HGB BLD-MCNC: 14.3 G/DL (ref 12–17)
IMM GRANULOCYTES # BLD AUTO: 0.12 THOUSAND/UL (ref 0–0.2)
IMM GRANULOCYTES NFR BLD AUTO: 1 % (ref 0–2)
LYMPHOCYTES # BLD AUTO: 1.51 THOUSANDS/ΜL (ref 0.6–4.47)
LYMPHOCYTES NFR BLD AUTO: 14 % (ref 14–44)
MAGNESIUM SERPL-MCNC: 2.1 MG/DL (ref 1.9–2.7)
MCH RBC QN AUTO: 28.5 PG (ref 26.8–34.3)
MCHC RBC AUTO-ENTMCNC: 32.3 G/DL (ref 31.4–37.4)
MCV RBC AUTO: 88 FL (ref 82–98)
MONOCYTES # BLD AUTO: 0.93 THOUSAND/ΜL (ref 0.17–1.22)
MONOCYTES NFR BLD AUTO: 9 % (ref 4–12)
NEUTROPHILS # BLD AUTO: 7.89 THOUSANDS/ΜL (ref 1.85–7.62)
NEUTS SEG NFR BLD AUTO: 71 % (ref 43–75)
NRBC BLD AUTO-RTO: 0 /100 WBCS
PLATELET # BLD AUTO: 395 THOUSANDS/UL (ref 149–390)
PMV BLD AUTO: 9.2 FL (ref 8.9–12.7)
POTASSIUM SERPL-SCNC: 3.9 MMOL/L (ref 3.5–5.3)
RBC # BLD AUTO: 5.02 MILLION/UL (ref 3.88–5.62)
SODIUM SERPL-SCNC: 137 MMOL/L (ref 135–147)
WBC # BLD AUTO: 10.98 THOUSAND/UL (ref 4.31–10.16)

## 2024-10-20 PROCEDURE — 83735 ASSAY OF MAGNESIUM: CPT | Performed by: STUDENT IN AN ORGANIZED HEALTH CARE EDUCATION/TRAINING PROGRAM

## 2024-10-20 PROCEDURE — 99232 SBSQ HOSP IP/OBS MODERATE 35: CPT | Performed by: STUDENT IN AN ORGANIZED HEALTH CARE EDUCATION/TRAINING PROGRAM

## 2024-10-20 PROCEDURE — 80048 BASIC METABOLIC PNL TOTAL CA: CPT | Performed by: STUDENT IN AN ORGANIZED HEALTH CARE EDUCATION/TRAINING PROGRAM

## 2024-10-20 PROCEDURE — 99233 SBSQ HOSP IP/OBS HIGH 50: CPT | Performed by: INTERNAL MEDICINE

## 2024-10-20 PROCEDURE — 99024 POSTOP FOLLOW-UP VISIT: CPT | Performed by: SPECIALIST

## 2024-10-20 PROCEDURE — 87081 CULTURE SCREEN ONLY: CPT | Performed by: HOSPITALIST

## 2024-10-20 PROCEDURE — 85025 COMPLETE CBC W/AUTO DIFF WBC: CPT | Performed by: STUDENT IN AN ORGANIZED HEALTH CARE EDUCATION/TRAINING PROGRAM

## 2024-10-20 RX ORDER — AMLODIPINE BESYLATE 10 MG/1
10 TABLET ORAL DAILY
Status: DISCONTINUED | OUTPATIENT
Start: 2024-10-21 | End: 2024-10-21 | Stop reason: HOSPADM

## 2024-10-20 RX ADMIN — LABETALOL HYDROCHLORIDE 10 MG: 5 INJECTION, SOLUTION INTRAVENOUS at 07:57

## 2024-10-20 RX ADMIN — ENOXAPARIN SODIUM 40 MG: 40 INJECTION SUBCUTANEOUS at 08:00

## 2024-10-20 RX ADMIN — VANCOMYCIN HYDROCHLORIDE 1500 MG: 5 INJECTION, POWDER, LYOPHILIZED, FOR SOLUTION INTRAVENOUS at 00:18

## 2024-10-20 RX ADMIN — HYDRALAZINE HYDROCHLORIDE 10 MG: 20 INJECTION, SOLUTION INTRAMUSCULAR; INTRAVENOUS at 13:04

## 2024-10-20 RX ADMIN — LABETALOL HYDROCHLORIDE 10 MG: 5 INJECTION, SOLUTION INTRAVENOUS at 17:43

## 2024-10-20 RX ADMIN — AMLODIPINE BESYLATE 5 MG: 5 TABLET ORAL at 08:00

## 2024-10-20 RX ADMIN — VANCOMYCIN HYDROCHLORIDE 1750 MG: 5 INJECTION, POWDER, LYOPHILIZED, FOR SOLUTION INTRAVENOUS at 12:45

## 2024-10-20 RX ADMIN — LOSARTAN POTASSIUM 100 MG: 50 TABLET, FILM COATED ORAL at 08:00

## 2024-10-20 NOTE — ASSESSMENT & PLAN NOTE
Reported 1 week history of abscess. Currently drainage with subjective improvement of symptoms since drainage began. S/p bedside I&D on 10/19 with packing of wound.    -changed dressing this am   -patient appropriate for discharge with permission to shower (rinse gently with soap and water) starting 10/21   -remove all dressing and packing before entering shower   -once patted dry, replace corner/edge of 4x4 guaze into wound to keep open  -blood cultures NG @24hrs  -wound culture negative  -wound recommend 5 day rx of Augmentin at discharge   -follow up in outpatient clinic this week for wound check; may require refill of Augmentin for another 5 days (10 days total)  -may continue warm compresses to groin to encourage drainage

## 2024-10-20 NOTE — ASSESSMENT & PLAN NOTE
54-year-old male with history of prior sentinel lymph node biopsy left inguinal region 5/22/2024 during the time of his melanoma excision.  He presented to our institution due to fever and acute pain of his inguinal area.  CT scan showed evidence of a left inguinal inflammatory process consistent with cellulitis with no associated drainable collection.  S/p I&D by surgery 10/19/2024. Cleared from a surgical standpoint for discharge.  Appreciate ID recommendations. Wound cultures <24 hours ago, awaiting cultures. They recommend we continue Vancomycin for the time being.

## 2024-10-20 NOTE — PROGRESS NOTES
Da Rogel is a 54 y.o. male who is currently ordered Vancomycin IV with management by the Pharmacy Consult service.  Relevant clinical data and objective / subjective history reviewed.  Vancomycin Assessment:  Indication and Goal AUC/Trough: Soft tissue (goal -600, trough >10)  Clinical Status: stable  Micro:     Renal Function:  SCr: 0.61 mg/dL  CrCl: 230.4 mL/min  Renal replacement: Not on dialysis  Days of Therapy: 3  Current Dose: 1500 mg IV q 12 hrs  Vancomycin Plan:  New Dosin mg IV q 12 hrs  Estimated AUC: 418 mcg*hr/mL  Estimated Trough: 10.9 mcg/mL  Next Level: 10/21/24 with am labs  Renal Function Monitoring: Daily BMP and UOP  Pharmacy will continue to follow closely for s/sx of nephrotoxicity, infusion reactions and appropriateness of therapy.  BMP and CBC will be ordered per protocol. We will continue to follow the patient’s culture results and clinical progress daily.    Negra Swann, Pharmacist

## 2024-10-20 NOTE — PROGRESS NOTES
Progress Note - Hospitalist   Name: Da Rogel 54 y.o. male I MRN: [id number]  Unit/Bed#: E5 -01 I Date of Admission: 10/18/2024   Date of Service: 10/20/2024 I Hospital Day: 2    Assessment & Plan  Abscess of left groin  54-year-old male with history of prior sentinel lymph node biopsy left inguinal region 5/22/2024 during the time of his melanoma excision.  He presented to our institution due to fever and acute pain of his inguinal area.  CT scan showed evidence of a left inguinal inflammatory process consistent with cellulitis with no associated drainable collection.  S/p I&D by surgery 10/19/2024. Cleared from a surgical standpoint for discharge.  Appreciate ID recommendations. Wound cultures <24 hours ago, awaiting cultures. They recommend we continue Vancomycin for the time being.   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 Continue outpatient follow-up with surgical oncology.     Tinea cruris    Morbid obesity (HCC)  Encourage lifestyle modification    VTE Pharmacologic Prophylaxis: VTE Score: 4 Moderate Risk (Score 3-4) - Pharmacological DVT Prophylaxis Ordered: enoxaparin (Lovenox).    Mobility:   Basic Mobility Inpatient Raw Score: 24  JH-HLM Goal: 8: Walk 250 feet or more  JH-HLM Achieved: 7: Walk 25 feet or more    Discussions with Specialists or Other Care Team Provider: ID, surgery    Education and Discussions with Family / Patient: patient, spouse, daughter    Current Length of Stay: 2 day(s)  Current Patient Status: Inpatient   Certification Statement: The patient will continue to require additional inpatient hospital stay due to iv antibiotics, id and surgery reevaluation  Discharge Plan: Anticipate discharge tomorrow to home.    Code Status: Level 1 - Full Code    Subjective   Patient seen and examined. Patient initially adamant about leaving, but eventually agreed to stay as per discussion of ID recommendations.     Objective   Vitals:   Temp (24hrs),  Av.2 °F (36.8 °C), Min:97.8 °F (36.6 °C), Max:98.9 °F (37.2 °C)    Temp:  [97.8 °F (36.6 °C)-98.9 °F (37.2 °C)] 97.8 °F (36.6 °C)  HR:  [] 90  Resp:  [18] 18  BP: (175-223)/(108-127) 188/117  SpO2:  [92 %-98 %] 98 %  Body mass index is 51.22 kg/m².     Input and Output Summary (last 24 hours):   No intake or output data in the 24 hours ending 10/20/24 1329    Physical Exam  Vitals reviewed.   Constitutional:       General: He is not in acute distress.  HENT:      Head: Normocephalic.      Nose: Nose normal.      Mouth/Throat:      Mouth: Mucous membranes are moist.   Eyes:      General: No scleral icterus.  Cardiovascular:      Rate and Rhythm: Normal rate and regular rhythm.   Pulmonary:      Effort: Pulmonary effort is normal. No respiratory distress.      Breath sounds: No wheezing or rales.   Abdominal:      General: There is no distension.      Palpations: Abdomen is soft.      Tenderness: There is no abdominal tenderness.   Skin:     General: Skin is warm.   Neurological:      Mental Status: He is alert and oriented to person, place, and time.   Psychiatric:         Mood and Affect: Mood normal.         Behavior: Behavior normal.       Lines/Drains:              Lab Results: I have reviewed the following results:   Results from last 7 days   Lab Units 10/20/24  0520 10/19/24  0610 10/18/24  1439   WBC Thousand/uL 10.98* 9.95 15.48*   HEMOGLOBIN g/dL 14.3 13.4 13.8   PLATELETS Thousands/uL 395* 313 359   MCV fL 88 89 84   INR   --   --  1.14     Results from last 7 days   Lab Units 10/20/24  0520 10/19/24  0610 10/18/24  1439   SODIUM mmol/L 137 138 137   POTASSIUM mmol/L 3.9 4.0 3.4*   CHLORIDE mmol/L 105 106 101   CO2 mmol/L 27 26 30   ANION GAP mmol/L 5 6 6   BUN mg/dL 12 14 9   CREATININE mg/dL 0.61 0.72 0.70   CALCIUM mg/dL 9.2 8.6 9.1   ALBUMIN g/dL  --   --  3.7   TOTAL BILIRUBIN mg/dL  --   --  0.75   ALK PHOS U/L  --   --  78   ALT U/L  --   --  30   AST U/L  --   --  15   EGFR ml/min/1.73sq  m 114 107 108   GLUCOSE RANDOM mg/dL 154* 154* 104     Results from last 7 days   Lab Units 10/20/24  0520 10/19/24  0610   MAGNESIUM mg/dL 2.1 2.1                  Results from last 7 days   Lab Units 10/18/24  1439   LACTIC ACID mmol/L 0.8   PROCALCITONIN ng/ml 0.09                   Recent Cultures (last 7 days):   Results from last 7 days   Lab Units 10/18/24  1440 10/18/24  1439   BLOOD CULTURE  No Growth at 24 hrs. No Growth at 24 hrs.       Imaging:  Reviewed radiology reports from this admission: no new imaging    Last 24 Hours Medication List:     Current Facility-Administered Medications:     acetaminophen (TYLENOL) tablet 650 mg, Q6H PRN    amLODIPine (NORVASC) tablet 5 mg, Daily    enoxaparin (LOVENOX) subcutaneous injection 40 mg, Daily    hydrALAZINE (APRESOLINE) injection 10 mg, Q6H PRN    labetalol (NORMODYNE) injection 10 mg, Q6H PRN    losartan (COZAAR) tablet 100 mg, QAM    oxyCODONE (ROXICODONE) IR tablet 5 mg, Q6H PRN    vancomycin (VANCOCIN) 1,750 mg in sodium chloride 0.9 % 500 mL IVPB, Q12H, Last Rate: 1,750 mg (10/20/24 1245)      **Please Note: This note may have been constructed using a voice recognition system.**

## 2024-10-20 NOTE — PLAN OF CARE
Problem: PAIN - ADULT  Goal: Verbalizes/displays adequate comfort level or baseline comfort level  Description: Interventions:  - Encourage patient to monitor pain and request assistance  - Assess pain using appropriate pain scale  - Administer analgesics based on type and severity of pain and evaluate response  - Implement non-pharmacological measures as appropriate and evaluate response  - Consider cultural and social influences on pain and pain management  - Notify physician/advanced practitioner if interventions unsuccessful or patient reports new pain  Outcome: Progressing     Problem: INFECTION - ADULT  Goal: Absence or prevention of progression during hospitalization  Description: INTERVENTIONS:  - Assess and monitor for signs and symptoms of infection  - Monitor lab/diagnostic results  - Monitor all insertion sites, i.e. indwelling lines, tubes, and drains  - Monitor endotracheal if appropriate and nasal secretions for changes in amount and color  - [place] appropriate cooling/warming therapies per order  - Administer medications as ordered  - Instruct and encourage patient and family to use good hand hygiene technique  - Identify and instruct in appropriate isolation precautions for identified infection/condition  Outcome: Progressing  Goal: Absence of fever/infection during neutropenic period  Description: INTERVENTIONS:  - Monitor WBC    Outcome: Progressing     Problem: SAFETY ADULT  Goal: Patient will remain free of falls  Description: INTERVENTIONS:  - Educate patient/family on patient safety including physical limitations  - Instruct patient to call for assistance with activity   - Consult OT/PT to assist with strengthening/mobility   - Keep Call bell within reach  - Keep bed low and locked with side rails adjusted as appropriate  - Keep care items and personal belongings within reach  - Initiate and maintain comfort rounds  - Make Fall Risk Sign visible to staff  - Apply yellow socks and bracelet  for high fall risk patients  - Consider moving patient to room near nurses station  Outcome: Progressing  Goal: Maintain or return to baseline ADL function  Description: INTERVENTIONS:  -  Assess patient's ability to carry out ADLs; assess patient's baseline for ADL function and identify physical deficits which impact ability to perform ADLs (bathing, care of mouth/teeth, toileting, grooming, dressing, etc.)  - Assess/evaluate cause of self-care deficits   - Assess range of motion  - Assess patient's mobility; develop plan if impaired  - Assess patient's need for assistive devices and provide as appropriate  - Encourage maximum independence but intervene and supervise when necessary  - Involve family in performance of ADLs  - Assess for home care needs following discharge   - Consider OT consult to assist with ADL evaluation and planning for discharge  - Provide patient education as appropriate  Outcome: Progressing  Goal: Maintains/Returns to pre admission functional level  Description: INTERVENTIONS:  - Perform AM-PAC 6 Click Basic Mobility/ Daily Activity assessment daily.  - Set and communicate daily mobility goal to care team and patient/family/caregiver.   - Collaborate with rehabilitation services on mobility goals if consulted  - Out of bed for toileting  - Record patient progress and toleration of activity level   Outcome: Progressing     Problem: DISCHARGE PLANNING  Goal: Discharge to home or other facility with appropriate resources  Description: INTERVENTIONS:  - Identify barriers to discharge w/patient and caregiver  - Arrange for needed discharge resources and transportation as appropriate  - Identify discharge learning needs (meds, wound care, etc.)  - Arrange for interpretive services to assist at discharge as needed  - Refer to Case Management Department for coordinating discharge planning if the patient needs post-hospital services based on physician/advanced practitioner order or complex needs  related to functional status, cognitive ability, or social support system  Outcome: Progressing     Problem: Knowledge Deficit  Goal: Patient/family/caregiver demonstrates understanding of disease process, treatment plan, medications, and discharge instructions  Description: Complete learning assessment and assess knowledge base.  Interventions:  - Provide teaching at level of understanding  - Provide teaching via preferred learning methods  Outcome: Progressing

## 2024-10-20 NOTE — CASE MANAGEMENT
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) 05/10/2024       LOS (days): 2  Geometric Mean LOS (GMLOS) (days):   Days to GMLOS:     OBJECTIVE:    Risk of Unplanned Readmission Score: 9         Current admission status: Inpatient       Preferred Pharmacy:   [facility] [address] [place] [address]  [address] [place] [address]  Phone: [phone] Fax: [phone]    Primary Care Provider: Jose Jane DO    Primary Insurance: [facility]  Secondary Insurance:     ASSESSMENT:  Active Health Care Proxies       JENNIFERGIGIDARSHAN [facility] Representative - [facility]   Primary Phone: [phone] (Mobile)                                                                    DISCHARGE DETAILS:    Discharge planning discussed with:: Pt reviewed with medical team. per medical team, potential discharged today vs tomm pending medical clearance. Pt to discharged home with no need. Cm spoke to pt confirmed baseline and transportation. Pt to driveself upon discharged.

## 2024-10-20 NOTE — PLAN OF CARE
Problem: PAIN - ADULT  Goal: Verbalizes/displays adequate comfort level or baseline comfort level  Description: Interventions:  - Encourage patient to monitor pain and request assistance  - Assess pain using appropriate pain scale  - Administer analgesics based on type and severity of pain and evaluate response  - Implement non-pharmacological measures as appropriate and evaluate response  - Consider cultural and social influences on pain and pain management  - Notify physician/advanced practitioner if interventions unsuccessful or patient reports new pain  Outcome: Progressing     Problem: INFECTION - ADULT  Goal: Absence or prevention of progression during hospitalization  Description: INTERVENTIONS:  - Assess and monitor for signs and symptoms of infection  - Monitor lab/diagnostic results  - Monitor all insertion sites, i.e. indwelling lines, tubes, and drains  - Monitor endotracheal if appropriate and nasal secretions for changes in amount and color  - [place] appropriate cooling/warming therapies per order  - Administer medications as ordered  - Instruct and encourage patient and family to use good hand hygiene technique  - Identify and instruct in appropriate isolation precautions for identified infection/condition  Outcome: Progressing  Goal: Absence of fever/infection during neutropenic period  Description: INTERVENTIONS:  - Monitor WBC    Outcome: Progressing     Problem: SAFETY ADULT  Goal: Patient will remain free of falls  Description: INTERVENTIONS:  - Educate patient/family on patient safety including physical limitations  - Instruct patient to call for assistance with activity   - Consult OT/PT to assist with strengthening/mobility   - Keep Call bell within reach  - Keep bed low and locked with side rails adjusted as appropriate  - Keep care items and personal belongings within reach  - Initiate and maintain comfort rounds  - Make Fall Risk Sign visible to staff  - Apply yellow socks and bracelet  for high fall risk patients  - Consider moving patient to room near nurses station  Outcome: Progressing  Goal: Maintain or return to baseline ADL function  Description: INTERVENTIONS:  -  Assess patient's ability to carry out ADLs; assess patient's baseline for ADL function and identify physical deficits which impact ability to perform ADLs (bathing, care of mouth/teeth, toileting, grooming, dressing, etc.)  - Assess/evaluate cause of self-care deficits   - Assess range of motion  - Assess patient's mobility; develop plan if impaired  - Assess patient's need for assistive devices and provide as appropriate  - Encourage maximum independence but intervene and supervise when necessary  - Involve family in performance of ADLs  - Assess for home care needs following discharge   - Consider OT consult to assist with ADL evaluation and planning for discharge  - Provide patient education as appropriate  Outcome: Progressing  Goal: Maintains/Returns to pre admission functional level  Description: INTERVENTIONS:  - Perform AM-PAC 6 Click Basic Mobility/ Daily Activity assessment daily.  - Set and communicate daily mobility goal to care team and patient/family/caregiver.   - Collaborate with rehabilitation services on mobility goals if consulted  - - Out of bed for toileting  - Record patient progress and toleration of activity level   Outcome: Progressing     Problem: DISCHARGE PLANNING  Goal: Discharge to home or other facility with appropriate resources  Description: INTERVENTIONS:  - Identify barriers to discharge w/patient and caregiver  - Arrange for needed discharge resources and transportation as appropriate  - Identify discharge learning needs (meds, wound care, etc.)  - Arrange for interpretive services to assist at discharge as needed  - Refer to Case Management Department for coordinating discharge planning if the patient needs post-hospital services based on physician/advanced practitioner order or complex needs  related to functional status, cognitive ability, or social support system  Outcome: Progressing     Problem: Knowledge Deficit  Goal: Patient/family/caregiver demonstrates understanding of disease process, treatment plan, medications, and discharge instructions  Description: Complete learning assessment and assess knowledge base.  Interventions:  - Provide teaching at level of understanding  - Provide teaching via preferred learning methods  Outcome: Progressing

## 2024-10-20 NOTE — PROGRESS NOTES
Progress Note - Surgery-General   Name: Da Rogel 54 y.o. male I MRN: [id number]  Unit/Bed#: E5 -01 I Date of Admission: 10/18/2024   Date of Service: 10/20/2024 I Hospital Day: 2    Assessment & Plan  Cellulitis of groin, left  Reported 1 week history of abscess. Currently drainage with subjective improvement of symptoms since drainage began. S/p bedside I&D on 10/19 with packing of wound.    -changed dressing this am   -patient appropriate for discharge with permission to shower (rinse gently with soap and water) starting 10/21   -remove all dressing and packing before entering shower   -once patted dry, replace corner/edge of 4x4 guaze into wound to keep open  -blood cultures NG @24hrs  -wound culture negative  -wound recommend 5 day rx of Augmentin at discharge   -follow up in outpatient clinic this week for wound check; may require refill of Augmentin for another 5 days (10 days total)  -may continue warm compresses to groin to encourage drainage  Morbid obesity (HCC)  Encourage outpatient follow up with primary care physician and adopting healthy dietary and exercise lifestyle decisions.    Ok for discharge from Surgery-General service perspective.    Subjective   NAEON. Patient severely hypertensive with SBPs over 220 overnight. Denies chest pain, SOB, or incisional pain. Patient required IV doses of prn BP medications. Incision well packed without acute concern. Replaced at bedside today.    Objective :  Temp:  [98 °F (36.7 °C)-98.9 °F (37.2 °C)] 98.9 °F (37.2 °C)  HR:  [] 106  BP: (190-207)/(113-132) 205/126  Resp:  [18-19] 18  SpO2:  [92 %-96 %] 96 %  O2 Device: None (Room air)    I/O         10/18 0701  10/19 0700 10/19 0701  10/20 0700    P.O.  120    IV Piggyback 2928     Total Intake(mL/kg) 2928 (17.1) 120 (0.7)    Net +2928 +120                Physical Exam:  GENERAL APPEARANCE: well developed, obese male in NAD. Sitting on bedside chair.  HEENT: NCAT; EOMI; normal external nose & ears;  MMM  NECK: Supple, normal ROM.  CARDIOVASCULAR: Regular rate. Grossly well perfused.  LUNGS/CHEST: Symmetric chest rise/fall with respirations.  ABD: Soft; non-distended; non-tender.  EXT: Normal ROM. No observable deformities in 4/4 extremities. No edema.  NEURO: AAOx4. No focal neurologic deficits. Distally neurovascularly intact.  SKIN: Warm, dry and well perfused; no jaundice. L groin 4cm cruciate incision packed with strip guaze w/o surrounding erythema, induration, or bleeding. Wound hemostatic and flushed with saline. Repacked and dressed in c/d/i absorptive dressing.sinus w/ serosanguinous drainage with mild surrounding erythema, no significant tenderness, no induration      Lab Results: I have reviewed the following results:  Recent Labs     10/18/24  1439 10/19/24  0610 10/20/24  0520   WBC 15.48*   < > 10.98*   HGB 13.8   < > 14.3   HCT 41.2   < > 44.3      < > 395*   SODIUM 137   < > 137   K 3.4*   < > 3.9      < > 105   CO2 30   < > 27   BUN 9   < > 12   CREATININE 0.70   < > 0.61   GLUC 104   < > 154*   MG  --    < > 2.1   AST 15  --   --    ALT 30  --   --    ALB 3.7  --   --    TBILI 0.75  --   --    ALKPHOS 78  --   --    PTT 34  --   --    INR 1.14  --   --    LACTICACID 0.8  --   --     < > = values in this interval not displayed.     Imaging Results Review: I reviewed radiology reports from this admission including: CT abdomen/pelvis.  CT abdomen pelvis with contrast   Final Result         1. Left inguinal inflammatory process consistent with cellulitis, which extends to the skin surface. No associated drainable collection. Some surrounding likely reactive mild adenopathy present.   2. Incidental findings as noted.         Workstation performed: [id number]           Other Study Results Review: EKG was reviewed.   EKG (10/18/2024): sinus tachycardia     VTE Pharmacologic Prophylaxis: VTE covered by:  enoxaparin, Subcutaneous, 40 mg at 10/20/24 0800     VTE Mechanical Prophylaxis:  sequential compression device

## 2024-10-20 NOTE — PLAN OF CARE
Problem: PAIN - ADULT  Goal: Verbalizes/displays adequate comfort level or baseline comfort level  Description: Interventions:  - Encourage patient to monitor pain and request assistance  - Assess pain using appropriate pain scale  - Administer analgesics based on type and severity of pain and evaluate response  - Implement non-pharmacological measures as appropriate and evaluate response  - Consider cultural and social influences on pain and pain management  - Notify physician/advanced practitioner if interventions unsuccessful or patient reports new pain  Outcome: Progressing     Problem: INFECTION - ADULT  Goal: Absence or prevention of progression during hospitalization  Description: INTERVENTIONS:  - Assess and monitor for signs and symptoms of infection  - Monitor lab/diagnostic results  - Monitor all insertion sites, i.e. indwelling lines, tubes, and drains  - Monitor endotracheal if appropriate and nasal secretions for changes in amount and color  - [place] appropriate cooling/warming therapies per order  - Administer medications as ordered  - Instruct and encourage patient and family to use good hand hygiene technique  - Identify and instruct in appropriate isolation precautions for identified infection/condition  Outcome: Progressing  Goal: Absence of fever/infection during neutropenic period  Description: INTERVENTIONS:  - Monitor WBC    Outcome: Progressing     Problem: SAFETY ADULT  Goal: Patient will remain free of falls  Description: INTERVENTIONS:  - Educate patient/family on patient safety including physical limitations  - Instruct patient to call for assistance with activity   - Consult OT/PT to assist with strengthening/mobility   - Keep Call bell within reach  - Keep bed low and locked with side rails adjusted as appropriate  - Keep care items and personal belongings within reach  - Initiate and maintain comfort rounds  - Make Fall Risk Sign visible to staff  -- Apply yellow socks and  bracelet for high fall risk patients  - Consider moving patient to room near nurses station  Outcome: Progressing  Goal: Maintain or return to baseline ADL function  Description: INTERVENTIONS:  -  Assess patient's ability to carry out ADLs; assess patient's baseline for ADL function and identify physical deficits which impact ability to perform ADLs (bathing, care of mouth/teeth, toileting, grooming, dressing, etc.)  - Assess/evaluate cause of self-care deficits   - Assess range of motion  - Assess patient's mobility; develop plan if impaired  - Assess patient's need for assistive devices and provide as appropriate  - Encourage maximum independence but intervene and supervise when necessary  - Involve family in performance of ADLs  - Assess for home care needs following discharge   - Consider OT consult to assist with ADL evaluation and planning for discharge  - Provide patient education as appropriate  Outcome: Progressing  Goal: Maintains/Returns to pre admission functional level  Description: INTERVENTIONS:  - Perform AM-PAC 6 Click Basic Mobility/ Daily Activity assessment daily.  - Set and communicate daily mobility goal to care team and patient/family/caregiver.   - Collaborate with rehabilitation services on mobility goals if consulted  - - Out of bed for toileting  - Record patient progress and toleration of activity level   Outcome: Progressing     Problem: DISCHARGE PLANNING  Goal: Discharge to home or other facility with appropriate resources  Description: INTERVENTIONS:  - Identify barriers to discharge w/patient and caregiver  - Arrange for needed discharge resources and transportation as appropriate  - Identify discharge learning needs (meds, wound care, etc.)  - Arrange for interpretive services to assist at discharge as needed  - Refer to Case Management Department for coordinating discharge planning if the patient needs post-hospital services based on physician/advanced practitioner order or  complex needs related to functional status, cognitive ability, or social support system  Outcome: Progressing     Problem: Knowledge Deficit  Goal: Patient/family/caregiver demonstrates understanding of disease process, treatment plan, medications, and discharge instructions  Description: Complete learning assessment and assess knowledge base.  Interventions:  - Provide teaching at level of understanding  - Provide teaching via preferred learning methods  Outcome: Progressing

## 2024-10-20 NOTE — PROGRESS NOTES
Progress Note - Infectious Disease   Name: Da Rogel 54 y.o. male I MRN: [id number]  Unit/Bed#: E5 -01 I Date of Admission: 10/18/2024   Date of Service: 10/20/2024 I Hospital Day: 2     Assessment & Plan  Abscess of left groin  Patient had prior sentinel lymph node biopsy of left inguinal region on 5/22/2024 at time of his melanoma excision.  Now presenting with several days of subjective fevers and acute pain of the inguinal area, with what appears to be a small wound with bleeding.  CT scan showing left inguinal fat inflammation, though no drainable abscess.  Would favor a Staphylococcal versus streptococcal infection. He has clinical evidence of folliculitis thus Staph felt most likely. No obvious history of MRSA per patient. There are no recent cultures in our system for review.  Surgery evaluated patient and felt to be of draining carbuncle.  They performed a bedside incision and drainage of the abscess cavity was about 4 cm in size, expressed bloody drainage which was sent for culture.  Patient is currently afebrile, hemodynamically stable.  -Continue IV Vancomycin to cover Staph including MRSA  -Follow-up incision and drainage culture to further adjust antibiotic  -Follow-up blood cultures  -Ongoing monitoring of wound and surgery input appreciated-Serial exams of groin to monitor for progression  -Will further adjust antibiotic based off of culture results, hope to transition to p.o. antibiotic soon  -Tentative plan for 10 days of antibiotic from incision and drainage as long as no further intervention anticipated from the surgical team  -Repeat CBC tomorrow to monitor WBC  -Monitor for fevers  Malignant melanoma of torso excluding breast (HCC)  Diagnosed earlier this year, involving mid back.  Status post surgical excision in OR on 5/22/2024.  It does not appear he follows with oncology or is on any systemic therapy at this time.  Morbid obesity (HCC)  Patient with BMI over 51.  This may impact  antibiotic dosing      I have discussed the above management plan in detail with the primary service.   They agree with plan to continue IV vancomycin.    Antibiotics:  Vancomycin: 2    Subjective   General surgery performed bedside I&D yesterday of left inguinal abscess.    Patient has no fever, chills, sweats; no nausea, vomiting, diarrhea.  Denies any left groin pain today.    Objective :  Temp:  [97.8 °F (36.6 °C)-98.9 °F (37.2 °C)] 97.8 °F (36.6 °C)  HR:  [] 121  BP: (194-223)/(113-132) 223/125  Resp:  [18-19] 18  SpO2:  [92 %-98 %] 98 %  O2 Device: None (Room air)    General:  No acute distress  Psychiatric:  Awake and alert  Pulmonary:  Normal respiratory excursion without accessory muscle use  Abdomen:  Soft, nontender  Extremities:  No edema  Skin: Folliculitis along lower abdomen, inguinal regions and face        Lab Results: I have reviewed the following results:  Results from last 7 days   Lab Units 10/20/24  0520 10/19/24  0610 10/18/24  1439   WBC Thousand/uL 10.98* 9.95 15.48*   HEMOGLOBIN g/dL 14.3 13.4 13.8   PLATELETS Thousands/uL 395* 313 359     Results from last 7 days   Lab Units 10/20/24  0520 10/19/24  0610 10/18/24  1439   SODIUM mmol/L 137 138 137   POTASSIUM mmol/L 3.9 4.0 3.4*   CHLORIDE mmol/L 105 106 101   CO2 mmol/L 27 26 30   BUN mg/dL 12 14 9   CREATININE mg/dL 0.61 0.72 0.70   EGFR ml/min/1.73sq m 114 107 108   CALCIUM mg/dL 9.2 8.6 9.1   AST U/L  --   --  15   ALT U/L  --   --  30   ALK PHOS U/L  --   --  78   ALBUMIN g/dL  --   --  3.7     Results from last 7 days   Lab Units 10/18/24  1440 10/18/24  1439   BLOOD CULTURE  No Growth at 24 hrs. No Growth at 24 hrs.     Results from last 7 days   Lab Units 10/18/24  1439   PROCALCITONIN ng/ml 0.09                 Imaging Results Review: No pertinent imaging studies reviewed.  Other Study Results Review: No additional pertinent studies reviewed.

## 2024-10-20 NOTE — ASSESSMENT & PLAN NOTE
Encourage outpatient follow up with primary care physician and adopting healthy dietary and exercise lifestyle decisions.

## 2024-10-20 NOTE — ASSESSMENT & PLAN NOTE
Patient had prior sentinel lymph node biopsy of left inguinal region on 5/22/2024 at time of his melanoma excision.  Now presenting with several days of subjective fevers and acute pain of the inguinal area, with what appears to be a small wound with bleeding.  CT scan showing left inguinal fat inflammation, though no drainable abscess.  Would favor a Staphylococcal versus streptococcal infection. He has clinical evidence of folliculitis thus Staph felt most likely. No obvious history of MRSA per patient. There are no recent cultures in our system for review.  Surgery evaluated patient and felt to be of draining carbuncle.  They performed a bedside incision and drainage of the abscess cavity was about 4 cm in size, expressed bloody drainage which was sent for culture.  Patient is currently afebrile, hemodynamically stable.  -Continue IV Vancomycin to cover Staph including MRSA  -Follow-up incision and drainage culture to further adjust antibiotic  -Follow-up blood cultures  -Ongoing monitoring of wound and surgery input appreciated-Serial exams of groin to monitor for progression  -Will further adjust antibiotic based off of culture results, hope to transition to p.o. antibiotic soon  -Tentative plan for 10 days of antibiotic from incision and drainage as long as no further intervention anticipated from the surgical team  -Repeat CBC tomorrow to monitor WBC  -Monitor for fevers

## 2024-10-20 NOTE — UTILIZATION REVIEW
Initial Clinical Review    Admission: Date/Time/Statement:   Admission Orders (From admission, onward)       Ordered        10/18/24 1808  INPATIENT ADMISSION  Once                          Orders Placed This Encounter   Procedures    INPATIENT ADMISSION     Standing Status:   Standing     Number of Occurrences:   1     Order Specific Question:   Level of Care     Answer:   Med Surg [16]     Order Specific Question:   Estimated length of stay     Answer:   More than 2 Midnights     Order Specific Question:   Certification     Answer:   I certify that inpatient services are medically necessary for this patient for a duration of greater than two midnights. See H&P and MD Progress Notes for additional information about the patient's course of treatment.     ED Arrival Information       Expected   -    Arrival   10/18/2024 13:08    Acuity   Urgent              Means of arrival   Wheelchair    Escorted by   Family Member    Service   Hospitalist    Admission type   Emergency              Arrival complaint   possible infection             Chief Complaint   Patient presents with    Cellulitis     Sent by PCP for skin infection, Lt groin/abdomen area.  Site was draining - brown/blood liquid about 11 am today. Intermittent fevers x 1 week       Initial Presentation: 54 y.o. male with a PMH of melanoma presented to the ED from home w/ 1 week of worsening left groin cellulitis and wound.   Pt reports hat he was started on OP abx yesterday, unable to remember the name of meds.   In the ED, T 99.1, , /98. WBC 15.48.   On exam, L groin w/ mild redness and warmth, has significant amount of bleeding, possibly from excoriation.  Given 3L IVF bolus, IV Vanco, IV Zosyn.    Admit as Inpatient for evaluation and treatment of cellulitis of L groin.  Plan: continue the Zosyn for now. Check a MRSA nasal swab. ID Consult. Gen surg consult.    Date: 10/19   Day 2:   Gen Surg Consult: Lt groin carbuncle, now spontaneously  draining.   Plan: Bedside I & D of abscess cavity. Cont IV abx until transition to po.      ID Consult: Pt w/ prior sentinel lymph node biopsy of left inguinal region on 5/22/2024 at time of his melanoma excision. Now w/ acute pain of the inguinal area, with what appears to be a small wound with bleeding.  CT scan showing left inguinal fat inflammation, though no current drainable abscess.   Plan: Lower concern for gram-negative infection thus will adjust antibiotics to IV Vancomycin to cover Staph including MRSA. Follow-up MRSA nares culture. Recommend surgical evaluation. Serial exams of groin to monitor for progression. Follow-up blood cultures. Repeat CBC tomorrow to monitor WBC. Monitor for fevers.    Gen Surg I&D Procedure Notes:  Open draining left groin sinus extended medially ~4cm with unroofed abscess with bloody drainage expressed from cavity. Persistent ooze which was packed with iodoform strip gauze and dressed with 4x4s and ABD pad     ED Treatment-Medication Administration from 10/18/2024 1308 to 10/18/2024 2000         Date/Time Order Dose Route Action     10/18/2024 1438 lactated ringers bolus 1,000 mL 1,000 mL Intravenous New Bag     10/18/2024 1544 lactated ringers bolus 1,000 mL 1,000 mL Intravenous New Bag     10/18/2024 1625 lactated ringers bolus 1,000 mL 1,000 mL Intravenous New Bag     10/18/2024 1546 vancomycin (VANCOCIN) 2,000 mg in sodium chloride 0.9 % 500 mL IVPB 2,000 mg Intravenous New Bag     10/18/2024 1450 piperacillin-tazobactam (ZOSYN) IVPB 4.5 g 4.5 g Intravenous New Bag     10/18/2024 1543 iohexol (OMNIPAQUE) 350 MG/ML injection (SINGLE-DOSE) 100 mL 100 mL Intravenous Given     10/18/2024 1908 piperacillin-tazobactam (ZOSYN) IVPB (EXTENDED INFUSION) 4.5 g 4.5 g Intravenous Given            Scheduled Medications:  amLODIPine, 5 mg, Oral, Daily  enoxaparin, 40 mg, Subcutaneous, Daily  losartan, 100 mg, Oral, QAM  vancomycin, 1,500 mg, Intravenous, Q12H  piperacillin-tazobactam  (ZOSYN) IVPB (EXTENDED INFUSION) 4.5 g  Dose: 4.5 g  Freq: Every 8 hours Route: IV  Start: 10/18/24 1900 End: 10/19/24 1038    Continuous IV Infusions: none     PRN Meds:  acetaminophen, 650 mg, Oral, Q6H PRN  hydrALAZINE, 10 mg, Intravenous, Q6H PRN 10/19 x 1  labetalol, 10 mg, Intravenous, Q6H PRN 10/19 x 1, 10/20 x 1  oxyCODONE, 5 mg, Oral, Q6H PRN      ED Triage Vitals [10/18/24 1314]   Temperature Pulse Respirations Blood Pressure SpO2 Pain Score   99.1 °F (37.3 °C) (!) 107 22 (!) 174/98 94 % No Pain     Weight (last 2 days)       Date/Time Weight    10/18/24 20:06:48 171 (377.65)    10/18/24 1312 169 (372.14)            Vital Signs (last 3 days)       Date/Time Temp Pulse Resp BP MAP (mmHg) SpO2 O2 Device Patient Position - Orthostatic VS Pain    10/20/24 0728 97.8 °F (36.6 °C) 121 18 223/125 158 98 % None (Room air) -- --    10/19/24 2300 98.9 °F (37.2 °C) 106 18 205/126 -- 96 % None (Room air) Lying --    10/19/24 2144 -- 109 -- 207/113 144 92 % -- -- --    10/19/24 2023 -- -- -- -- -- -- -- -- No Pain    10/19/24 1535 98 °F (36.7 °C) 99 -- 194/127 149 93 % -- -- --    10/19/24 10:38:32 -- 102 19 207/132 157 92 % -- -- --    10/19/24 0830 -- -- -- -- -- -- -- -- No Pain    10/19/24 07:33:31 98.1 °F (36.7 °C) 89 19 190/120 143 93 % -- -- --    10/19/24 07:32:05 98.1 °F (36.7 °C) 89 18 197/115 142 94 % -- -- --    10/18/24 23:04:15 98.8 °F (37.1 °C) 98 18 165/108 127 95 % None (Room air) Lying --    10/18/24 2233 -- -- -- -- -- -- -- -- No Pain    10/18/24 2022 -- -- -- -- -- -- -- -- No Pain    10/18/24 20:06:48 98.4 °F (36.9 °C) 102 18 172/113 133 93 % None (Room air) Sitting --    10/18/24 1900 -- 104 20 166/89 115 90 % None (Room air) Sitting --    10/18/24 1845 -- 99 20 170/97 126 94 % None (Room air) Sitting --    10/18/24 1630 -- 96 20 186/89 127 95 % None (Room air) Sitting --    10/18/24 1600 -- 96 22 185/104 135 95 % None (Room air) Sitting --    10/18/24 1314 99.1 °F (37.3 °C) 107 22 174/98 -- 94 %  "-- -- No Pain              Pertinent Labs/Diagnostic Test Results:   Radiology:  CT abdomen pelvis with contrast   Final Interpretation by Archie Adhikari MD (10/18 1602)         1. Left inguinal inflammatory process consistent with cellulitis, which extends to the skin surface. No associated drainable collection. Some surrounding likely reactive mild adenopathy present.   2. Incidental findings as noted.         Workstation performed: [id number]           Cardiology:  No orders to display     GI:  No orders to display           Results from last 7 days   Lab Units 10/20/24  0520 10/19/24  0610 10/18/24  1439   WBC Thousand/uL 10.98* 9.95 15.48*   HEMOGLOBIN g/dL 14.3 13.4 13.8   HEMATOCRIT % 44.3 41.2 41.2   PLATELETS Thousands/uL 395* 313 359   TOTAL NEUT ABS Thousands/µL 7.89* 6.95 11.62*         Results from last 7 days   Lab Units 10/20/24  0520 10/19/24  0610 10/18/24  1439   SODIUM mmol/L 137 138 137   POTASSIUM mmol/L 3.9 4.0 3.4*   CHLORIDE mmol/L 105 106 101   CO2 mmol/L 27 26 30   ANION GAP mmol/L 5 6 6   BUN mg/dL 12 14 9   CREATININE mg/dL 0.61 0.72 0.70   EGFR ml/min/1.73sq m 114 107 108   CALCIUM mg/dL 9.2 8.6 9.1   MAGNESIUM mg/dL 2.1 2.1  --      Results from last 7 days   Lab Units 10/18/24  1439   AST U/L 15   ALT U/L 30   ALK PHOS U/L 78   TOTAL PROTEIN g/dL 7.6   ALBUMIN g/dL 3.7   TOTAL BILIRUBIN mg/dL 0.75         Results from last 7 days   Lab Units 10/20/24  0520 10/19/24  0610 10/18/24  1439   GLUCOSE RANDOM mg/dL 154* 154* 104             No results found for: \"BETA-HYDROXYBUTYRATE\"                           Results from last 7 days   Lab Units 10/18/24  1439   PROTIME seconds 14.8   INR  1.14   PTT seconds 34         Results from last 7 days   Lab Units 10/18/24  1439   PROCALCITONIN ng/ml 0.09     Results from last 7 days   Lab Units 10/18/24  1439   LACTIC ACID mmol/L 0.8                                                 Results from last 7 days   Lab Units 10/18/24  1994   CLARITY " UA  Clear   COLOR UA  Colorless   SPEC GRAV UA  1.033*   PH UA  8.0   GLUCOSE UA mg/dl Negative   KETONES UA mg/dl Negative   BLOOD UA  Negative   PROTEIN UA mg/dl Negative   NITRITE UA  Negative   BILIRUBIN UA  Negative   UROBILINOGEN UA (BE) mg/dl <2.0   LEUKOCYTES UA  Negative   WBC UA /hpf 1-2   RBC UA /hpf 2-4*   BACTERIA UA /hpf None Seen   EPITHELIAL CELLS WET PREP /hpf None Seen                                 Results from last 7 days   Lab Units 10/18/24  1440 10/18/24  1439   BLOOD CULTURE  No Growth at 24 hrs. No Growth at 24 hrs.                   Past Medical History:   Diagnosis Date    Hypertension     Sleep apnea     No CPAP     Present on Admission:   Malignant melanoma of torso excluding breast (HCC)      Admitting Diagnosis: Cellulitis [L03.90]  Cellulitis of other specified site [L03.818]  Age/Sex: 54 y.o. male    Network Utilization Review Department  ATTENTION: Please call with any questions or concerns to [phone] and carefully listen to the prompts so that you are directed to the right person. All voicemails are confidential.   For Discharge needs, contact Care Management DC Support Team at [phone] opt. 2  Send all requests for admission clinical reviews, approved or denied determinations and any other requests to dedicated fax number below belonging to the campus where the patient is receiving treatment. List of dedicated fax numbers for the Facilities:  FACILITY NAME UR FAX NUMBER   ADMISSION DENIALS (Administrative/Medical Necessity) [phone]   DISCHARGE SUPPORT TEAM (NETWORK) [phone]   PARENT CHILD HEALTH (Maternity/NICU/Pediatrics) [phone]   [facility] [phone]   [facility] [phone]   [facility] [phone]   [facility] [phone]   [facility] [phone]   [facility] [phone]   [facility]  [facility] [phone]   [facility] [phone]   [facility] [phone]   [facility] [phone]   [facility] [phone]   [facility] [phone]

## 2024-10-21 VITALS
HEIGHT: 72 IN | DIASTOLIC BLOOD PRESSURE: 112 MMHG | HEART RATE: 104 BPM | WEIGHT: 315 LBS | OXYGEN SATURATION: 98 % | TEMPERATURE: 98.4 F | BODY MASS INDEX: 42.66 KG/M2 | SYSTOLIC BLOOD PRESSURE: 179 MMHG | RESPIRATION RATE: 20 BRPM

## 2024-10-21 PROBLEM — I87.8 VENOUS STASIS: Status: ACTIVE | Noted: 2024-10-21

## 2024-10-21 LAB
ANION GAP SERPL CALCULATED.3IONS-SCNC: 5 MMOL/L (ref 4–13)
BACTERIA WND AEROBE CULT: ABNORMAL
BUN SERPL-MCNC: 12 MG/DL (ref 5–25)
CALCIUM SERPL-MCNC: 9 MG/DL (ref 8.4–10.2)
CHLORIDE SERPL-SCNC: 103 MMOL/L (ref 96–108)
CO2 SERPL-SCNC: 30 MMOL/L (ref 21–32)
CREAT SERPL-MCNC: 0.64 MG/DL (ref 0.6–1.3)
ERYTHROCYTE [DISTWIDTH] IN BLOOD BY AUTOMATED COUNT: 13.5 % (ref 11.6–15.1)
GFR SERPL CREATININE-BSD FRML MDRD: 112 ML/MIN/1.73SQ M
GLUCOSE SERPL-MCNC: 161 MG/DL (ref 65–140)
GRAM STN SPEC: ABNORMAL
GRAM STN SPEC: ABNORMAL
HCT VFR BLD AUTO: 45.6 % (ref 36.5–49.3)
HGB BLD-MCNC: 14.7 G/DL (ref 12–17)
MAGNESIUM SERPL-MCNC: 2 MG/DL (ref 1.9–2.7)
MCH RBC QN AUTO: 27.7 PG (ref 26.8–34.3)
MCHC RBC AUTO-ENTMCNC: 32.2 G/DL (ref 31.4–37.4)
MCV RBC AUTO: 86 FL (ref 82–98)
MRSA NOSE QL CULT: NORMAL
PLATELET # BLD AUTO: 377 THOUSANDS/UL (ref 149–390)
PMV BLD AUTO: 8.8 FL (ref 8.9–12.7)
POTASSIUM SERPL-SCNC: 4.2 MMOL/L (ref 3.5–5.3)
RBC # BLD AUTO: 5.31 MILLION/UL (ref 3.88–5.62)
SODIUM SERPL-SCNC: 138 MMOL/L (ref 135–147)
VANCOMYCIN SERPL-MCNC: 11.2 UG/ML (ref 10–20)
WBC # BLD AUTO: 11.38 THOUSAND/UL (ref 4.31–10.16)

## 2024-10-21 PROCEDURE — 80202 ASSAY OF VANCOMYCIN: CPT | Performed by: INTERNAL MEDICINE

## 2024-10-21 PROCEDURE — 80048 BASIC METABOLIC PNL TOTAL CA: CPT | Performed by: STUDENT IN AN ORGANIZED HEALTH CARE EDUCATION/TRAINING PROGRAM

## 2024-10-21 PROCEDURE — 85027 COMPLETE CBC AUTOMATED: CPT | Performed by: STUDENT IN AN ORGANIZED HEALTH CARE EDUCATION/TRAINING PROGRAM

## 2024-10-21 PROCEDURE — 99239 HOSP IP/OBS DSCHRG MGMT >30: CPT | Performed by: INTERNAL MEDICINE

## 2024-10-21 PROCEDURE — 99232 SBSQ HOSP IP/OBS MODERATE 35: CPT | Performed by: STUDENT IN AN ORGANIZED HEALTH CARE EDUCATION/TRAINING PROGRAM

## 2024-10-21 PROCEDURE — 83735 ASSAY OF MAGNESIUM: CPT | Performed by: STUDENT IN AN ORGANIZED HEALTH CARE EDUCATION/TRAINING PROGRAM

## 2024-10-21 RX ORDER — CEPHALEXIN 500 MG/1
1000 CAPSULE ORAL EVERY 6 HOURS SCHEDULED
Qty: 56 CAPSULE | Refills: 0 | Status: SHIPPED | OUTPATIENT
Start: 2024-10-21 | End: 2024-10-28

## 2024-10-21 RX ORDER — ACETAMINOPHEN 325 MG/1
650 TABLET ORAL EVERY 6 HOURS PRN
Start: 2024-10-21

## 2024-10-21 RX ORDER — CEPHALEXIN 500 MG/1
1000 CAPSULE ORAL EVERY 6 HOURS SCHEDULED
Status: DISCONTINUED | OUTPATIENT
Start: 2024-10-21 | End: 2024-10-21 | Stop reason: HOSPADM

## 2024-10-21 RX ORDER — AMLODIPINE BESYLATE 10 MG/1
10 TABLET ORAL DAILY
Qty: 30 TABLET | Refills: 0 | Status: SHIPPED | OUTPATIENT
Start: 2024-10-22 | End: 2024-11-21

## 2024-10-21 RX ADMIN — LOSARTAN POTASSIUM 100 MG: 50 TABLET, FILM COATED ORAL at 08:24

## 2024-10-21 RX ADMIN — ENOXAPARIN SODIUM 40 MG: 40 INJECTION SUBCUTANEOUS at 08:24

## 2024-10-21 RX ADMIN — VANCOMYCIN HYDROCHLORIDE 1500 MG: 1 INJECTION, POWDER, LYOPHILIZED, FOR SOLUTION INTRAVENOUS at 10:09

## 2024-10-21 RX ADMIN — AMLODIPINE BESYLATE 10 MG: 10 TABLET ORAL at 08:24

## 2024-10-21 RX ADMIN — VANCOMYCIN HYDROCHLORIDE 1750 MG: 5 INJECTION, POWDER, LYOPHILIZED, FOR SOLUTION INTRAVENOUS at 00:27

## 2024-10-21 NOTE — TREATMENT PLAN
Patient seen and examine at bedside    Left groin I&D site dressing moderately saturated. This was removed and wound was flushed with 10cc sterile saline. Wound probed and measured approximately 4 cm in depth. This was then packed with the corner of a 4x4 gauze, covered with ABD, and secured with paper tape.    Patient's friend who will be assisting in dressing changes going forward was present for the procedure. All questions and concerns addressed.    Jigna Schuler PA-C  10/21/24  1:34 PM

## 2024-10-21 NOTE — UTILIZATION REVIEW
Continued Stay Review    Date:    10/20     10/21                          Current Patient Class: Inpatient  Current Level of Care:  med surg    HPI:54 y.o. male initially admitted on   10/18  with cellulitis  L groin    Assessment/Plan:   10/20   Remains on  ANTWAN.     Adamant   about leaving,  agreed to stay  for further  treatment.  F/U blood cultures.   Folliculitis  along lower abdomen, inguinal region and  face.    10/21  Remain  on  ANTWAN>   Left groin  dressing changed, wound probed   and packed.       Medications:   Scheduled Medications:  amLODIPine, 10 mg, Oral, Daily  enoxaparin, 40 mg, Subcutaneous, Daily  losartan, 100 mg, Oral, QAM  vancomycin, 1,500 mg, Intravenous, Q8H      Continuous IV Infusions:     PRN Meds:  acetaminophen, 650 mg, Oral, Q6H PRN  hydrALAZINE, 10 mg, Intravenous, Q6H PRN  labetalol, 10 mg, Intravenous, Q6H PRN  oxyCODONE, 5 mg, Oral, Q6H PRN      Discharge Plan:  TBD    Vital Signs (last 3 days)       Date/Time Temp Pulse Resp BP MAP (mmHg) SpO2 O2 Device Patient Position - Orthostatic VS Pain    10/21/24 0746 98.3 °F (36.8 °C) 103 20 148/110 -- 98 % None (Room air) Sitting No Pain    10/20/24 2300 97.9 °F (36.6 °C) 100 18 146/99 -- 99 % None (Room air) Lying --    10/20/24 1928 -- -- -- -- -- -- -- -- No Pain    10/20/24 1838 -- 105 -- 141/112 -- -- -- Lying --    10/20/24 1557 98.3 °F (36.8 °C) 110 18 195/116 142 93 % None (Room air) Sitting --    10/20/24 1427 -- 108 -- 188/112 137 95 % None (Room air) Sitting --    10/20/24 1304 -- -- -- 188/117 -- -- -- -- --    10/20/24 0900 -- 90 -- 175/108 -- -- -- -- --    10/20/24 0800 -- -- -- -- -- -- -- -- No Pain    10/20/24 0728 97.8 °F (36.6 °C) 121 18 223/125 158 98 % None (Room air) -- --    10/19/24 2300 98.9 °F (37.2 °C) 106 18 205/126 -- 96 % None (Room air) Lying --    10/19/24 2144 -- 109 -- 207/113 144 92 % -- -- --    10/19/24 2023 -- -- -- -- -- -- -- -- No Pain    10/19/24 1535 98 °F (36.7 °C) 99 -- 194/127 149 93 % -- --  --    10/19/24 10:38:32 -- 102 19 207/132 157 92 % -- -- --    10/19/24 0830 -- -- -- -- -- -- -- -- No Pain    10/19/24 07:33:31 98.1 °F (36.7 °C) 89 19 190/120 143 93 % -- -- --    10/19/24 07:32:05 98.1 °F (36.7 °C) 89 18 197/115 142 94 % -- -- --    10/18/24 23:04:15 98.8 °F (37.1 °C) 98 18 165/108 127 95 % None (Room air) Lying --    10/18/24 2233 -- -- -- -- -- -- -- -- No Pain    10/18/24 2022 -- -- -- -- -- -- -- -- No Pain    10/18/24 20:06:48 98.4 °F (36.9 °C) 102 18 172/113 133 93 % None (Room air) Sitting --    10/18/24 1900 -- 104 20 166/89 115 90 % None (Room air) Sitting --    10/18/24 1845 -- 99 20 170/97 126 94 % None (Room air) Sitting --    10/18/24 1630 -- 96 20 186/89 127 95 % None (Room air) Sitting --    10/18/24 1600 -- 96 22 185/104 135 95 % None (Room air) Sitting --    10/18/24 1314 99.1 °F (37.3 °C) 107 22 174/98 -- 94 % -- -- No Pain          Pertinent Labs/Diagnostic Results:   Radiology:  CT abdomen pelvis with contrast   Final Interpretation by Archie Adhikari MD (10/18 1602)         1. Left inguinal inflammatory process consistent with cellulitis, which extends to the skin surface. No associated drainable collection. Some surrounding likely reactive mild adenopathy present.   2. Incidental findings as noted.         Workstation performed: [id number]                 Results from last 7 days   Lab Units 10/21/24  0718 10/20/24  0520 10/19/24  0610 10/18/24  1439   WBC Thousand/uL 11.38* 10.98* 9.95 15.48*   HEMOGLOBIN g/dL 14.7 14.3 13.4 13.8   HEMATOCRIT % 45.6 44.3 41.2 41.2   PLATELETS Thousands/uL 377 395* 313 359   TOTAL NEUT ABS Thousands/µL  --  7.89* 6.95 11.62*         Results from last 7 days   Lab Units 10/21/24  0718 10/20/24  0520 10/19/24  0610 10/18/24  1439   SODIUM mmol/L 138 137 138 137   POTASSIUM mmol/L 4.2 3.9 4.0 3.4*   CHLORIDE mmol/L 103 105 106 101   CO2 mmol/L 30 27 26 30   ANION GAP mmol/L 5 5 6 6   BUN mg/dL 12 12 14 9   CREATININE mg/dL 0.64 0.61 0.72  0.70   EGFR ml/min/1.73sq m 112 114 107 108   CALCIUM mg/dL 9.0 9.2 8.6 9.1   MAGNESIUM mg/dL 2.0 2.1 2.1  --            Results from last 7 days   Lab Units 10/21/24  0718 10/20/24  0520 10/19/24  0610 10/18/24  1439   GLUCOSE RANDOM mg/dL 161* 154* 154* 104               Results from last 7 days   Lab Units 10/21/24  0718   VANCOMYCIN RM ug/mL 11.2       Network Utilization Review Department  ATTENTION: Please call with any questions or concerns to [phone] and carefully listen to the prompts so that you are directed to the right person. All voicemails are confidential.   For Discharge needs, contact Care Management DC Support Team at [phone] opt. 2  Send all requests for admission clinical reviews, approved or denied determinations and any other requests to dedicated fax number below belonging to the campus where the patient is receiving treatment. List of dedicated fax numbers for the Facilities:  FACILITY NAME UR FAX NUMBER   ADMISSION DENIALS (Administrative/Medical Necessity) [phone]   DISCHARGE SUPPORT TEAM (NETWORK) [phone]   PARENT CHILD HEALTH (Maternity/NICU/Pediatrics) [phone]   [facility] [phone]   [facility] [phone]   [facility] [phone]   [facility] [phone]   [facility] [phone]   [facility] [phone]   [facility] [phone]   [facility] [phone]   [facility] [phone]   [facility] [phone]   [facility] [phone]   [facility] [phone]

## 2024-10-21 NOTE — CONSULTS
Vancomycin IV Pharmacy-to-Dose Consultation     Vancomycin has been discontinued.  Pharmacy will sign off.  Please contact or re-consult with questions.    Negra Swann, Pharmacist

## 2024-10-21 NOTE — ASSESSMENT & PLAN NOTE
54-year-old male with history of prior sentinel lymph node biopsy left inguinal region 5/22/2024 during the time of his melanoma excision.  He presented to our institution due to fever and acute pain of his inguinal area.  CT scan showed evidence of a left inguinal inflammatory process consistent with cellulitis with no associated drainable collection.  S/p I&D by surgery 10/19/2024. Cleared from a surgical standpoint for discharge.  Appreciate ID recommendations. Wound cultures grew group b strep. Had been treated with vanco. Changed to keflex through 10/28

## 2024-10-21 NOTE — PROGRESS NOTES
Progress Note - Hospitalist   Name: Da Rogel 54 y.o. male I MRN: [id number]  Unit/Bed#: E5 -01 I Date of Admission: 10/18/2024   Date of Service: 10/21/2024 I Hospital Day: 3    Assessment & Plan  Abscess of left groin  54-year-old male with history of prior sentinel lymph node biopsy left inguinal region 5/22/2024 during the time of his melanoma excision.  He presented to our institution due to fever and acute pain of his inguinal area.  CT scan showed evidence of a left inguinal inflammatory process consistent with cellulitis with no associated drainable collection.  S/p I&D by surgery 10/19/2024. Cleared from a surgical standpoint for discharge.  Appreciate ID recommendations. Wound cultures grew group b strep. Had been treated with vanco. 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 Continue outpatient follow-up with surgical oncology.     Tinea cruris    Morbid obesity (HCC)  Encourage lifestyle modification  HTN (hypertension)  Uncontrolled  Increase amlodipine to 10mg daily  Continue losartan  Recommend bp check next week   Venous stasis      VTE Pharmacologic Prophylaxis: VTE Score: 4 lovenox     Mobility:   Basic Mobility Inpatient Raw Score: 24  JH-HLM Goal: 8: Walk 250 feet or more  JH-HLM Achieved: 8: Walk 250 feet ot more    Discharge Plan: discharge to home     Code Status: Level 1 - Full Code    Subjective   Pt seen and examined. Pt states he is doing better. Family members will help with dressing changes. No other problems     Objective :  Temp:  [97.9 °F (36.6 °C)-98.4 °F (36.9 °C)] 98.4 °F (36.9 °C)  HR:  [100-110] 104  BP: (141-195)/() 179/112  Resp:  [18-20] 20  SpO2:  [93 %-99 %] 98 %  O2 Device: None (Room air)    Body mass index is 51.22 kg/m².     Input and Output Summary (last 24 hours):     Intake/Output Summary (Last 24 hours) at 10/21/2024 1504  Last data filed at 10/21/2024 0832  Gross per 24 hour   Intake  200 ml   Output --   Net 200 ml       Physical Exam  Constitutional:       Appearance: Normal appearance.   HENT:      Head: Normocephalic and atraumatic.   Eyes:      Extraocular Movements: Extraocular movements intact.      Pupils: Pupils are equal, round, and reactive to light.   Cardiovascular:      Rate and Rhythm: Normal rate and regular rhythm.      Heart sounds: No murmur heard.     No friction rub. No gallop.   Pulmonary:      Effort: Pulmonary effort is normal. No respiratory distress.      Breath sounds: Normal breath sounds. No wheezing, rhonchi or rales.   Abdominal:      General: Bowel sounds are normal. There is no distension.      Palpations: Abdomen is soft.      Tenderness: There is no abdominal tenderness. There is no guarding or rebound.   Neurological:      Mental Status: He is alert and oriented to person, place, and time.       Lines/Drains:      Lab Results: I have reviewed the following results:   Results from last 7 days   Lab Units 10/21/24  0718 10/20/24  0520   WBC Thousand/uL 11.38* 10.98*   HEMOGLOBIN g/dL 14.7 14.3   HEMATOCRIT % 45.6 44.3   PLATELETS Thousands/uL 377 395*   SEGS PCT %  --  71   LYMPHO PCT %  --  14   MONO PCT %  --  9   EOS PCT %  --  4     Results from last 7 days   Lab Units 10/21/24  0718 10/19/24  0610 10/18/24  1439   SODIUM mmol/L 138   < > 137   POTASSIUM mmol/L 4.2   < > 3.4*   CHLORIDE mmol/L 103   < > 101   CO2 mmol/L 30   < > 30   BUN mg/dL 12   < > 9   CREATININE mg/dL 0.64   < > 0.70   ANION GAP mmol/L 5   < > 6   CALCIUM mg/dL 9.0   < > 9.1   ALBUMIN g/dL  --   --  3.7   TOTAL BILIRUBIN mg/dL  --   --  0.75   ALK PHOS U/L  --   --  78   ALT U/L  --   --  30   AST U/L  --   --  15   GLUCOSE RANDOM mg/dL 161*   < > 104    < > = values in this interval not displayed.     Results from last 7 days   Lab Units 10/18/24  1439   INR  1.14             Results from last 7 days   Lab Units 10/18/24  1439   LACTIC ACID mmol/L 0.8   PROCALCITONIN ng/ml 0.09        Recent Cultures (last 7 days):   Results from last 7 days   Lab Units 10/19/24  1453 10/18/24  1440 10/18/24  1439   BLOOD CULTURE   --  No Growth at 48 hrs. No Growth at 48 hrs.   GRAM STAIN RESULT  1+ Polys  No bacteria seen  --   --    WOUND CULTURE  1+ Growth of Streptococcus agalactiae (Group B)*  --   --        Imaging Results Review: No pertinent imaging studies reviewed.  Other Study Results Review: No additional pertinent studies reviewed.    Last 24 Hours Medication List:     Current Facility-Administered Medications:     acetaminophen (TYLENOL) tablet 650 mg, Q6H PRN    amLODIPine (NORVASC) tablet 10 mg, Daily    cephalexin (KEFLEX) capsule 1,000 mg, Q6H BRAD    enoxaparin (LOVENOX) subcutaneous injection 40 mg, Daily    hydrALAZINE (APRESOLINE) injection 10 mg, Q6H PRN    labetalol (NORMODYNE) injection 10 mg, Q6H PRN    losartan (COZAAR) tablet 100 mg, QAM    oxyCODONE (ROXICODONE) IR tablet 5 mg, Q6H PRN    Administrative Statements   Today, Patient Was Seen By: Valorie Camarena DO

## 2024-10-21 NOTE — PROGRESS NOTES
Da Rogel is a 54 y.o. male who is currently ordered Vancomycin IV with management by the Pharmacy Consult service.  Relevant clinical data and objective / subjective history reviewed.  Vancomycin Assessment:  Indication and Goal AUC/Trough: Soft tissue (goal -600, trough >10)  Clinical Status: stable  Micro:   pending  Renal Function:  SCr: 0.64 mg/dL  CrCl: 219.6 mL/min  Renal replacement: Not on dialysis  Days of Therapy: 4  Current Dose: 1750 mg IV q 12 hrs  Vancomycin Plan:  New Dosin mg IV q 8 hrs  Estimated AUC: 453 mcg*hr/mL  Estimated Trough: 13.2 mcg/mL  Next Level: 10/28/24 with am labs  Renal Function Monitoring: Daily BMP and UOP  Pharmacy will continue to follow closely for s/sx of nephrotoxicity, infusion reactions and appropriateness of therapy.  BMP and CBC will be ordered per protocol. We will continue to follow the patient’s culture results and clinical progress daily.    Negra Swann, Pharmacist

## 2024-10-21 NOTE — WOUND OSTOMY CARE
Consult Note - Wound   Da Line 54 y.o. male MRN: [id number]  Unit/Bed#: E5 -01 Encounter: [id number]      History and Present Illness:  54 year old male presented to the hospital with left groin draining wound.  Patient's history significant for melanoma of the torso, HTN.    Wound care was consulted for left groin wound on admission by SL[facility] team.     Patient is status post I & D of abscess on 10/19/24--Surgery team is managing the wound.    Patient has no other skin/wound issues per the primary RN.      Wound care team will sign-off at this time.  Dr. Camarena made aware.      Tyesha Davila RN, BSN, CWON

## 2024-10-21 NOTE — DISCHARGE SUMMARY
Discharge Summary - Hospitalist   Name: Da Rogel 54 y.o. male I MRN: [id number]  Unit/Bed#: E5 -01 I Date of Admission: 10/18/2024   Date of Service: 10/21/2024 I Hospital Day: 3     Assessment & Plan  Abscess of left groin  54-year-old male with history of prior sentinel lymph node biopsy left inguinal region 5/22/2024 during the time of his melanoma excision.  He presented to our institution due to fever and acute pain of his inguinal area.  CT scan showed evidence of a left inguinal inflammatory process consistent with cellulitis with no associated drainable collection.  S/p I&D by surgery 10/19/2024. Cleared from a surgical standpoint for discharge.  Appreciate ID recommendations. Wound cultures grew group b strep. Had been treated with vanco. 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 Continue outpatient follow-up with surgical oncology.     Tinea cruris    Morbid obesity (HCC)  Encourage lifestyle modification  HTN (hypertension)  Uncontrolled  Increase amlodipine to 10mg daily  Continue losartan  Recommend bp check next week   Venous stasis       Medical Problems       Resolved Problems  Date Reviewed: 10/21/2024   None       Discharging Physician / Practitioner: Valorie Camarena DO  PCP: Jose Jane DO  Admission Date:   Admission Orders (From admission, onward)       Ordered        10/18/24 1808  INPATIENT ADMISSION  Once                          Discharge Date: 10/21/24    Consultations During Hospital Stay:  Gen surgery   Infectious disease    Procedures Performed:   S/p I and D on 10/19/2024    Significant Findings / Test Results:   None    Incidental Findings:   none    Test Results Pending at Discharge (will require follow up):   none     Outpatient Tests Requested:  none    Reason for Admission: left groin cellulitis     Hospital Course:   Da Rogel is a 54 y.o. male patient who originally presented to the hospital on  10/18/2024 due to left groin cellulitis in which he had a abscess. He was evaluated by ID and surgery. He was treated with vancomycin and is s/p I an D. Pts wound grew group b strep and he was changed to keflex 1gm q6 hours through 10/28. Pt was discharge to home. He did have elevated bp during his hospital stay. He was continued on losartan and norvasc of 10mg was added. He will follow up with pcp for bp check in 1 week. He will also need to follow up with gen surgery     Please see above list of diagnoses and related plan for additional information.     Discharge Day Visit / Exam:   Please see progress note from today     Discharge instructions/Information to patient and family:   See after visit summary for information provided to patient and family.      Provisions for Follow-Up Care:  See after visit summary for information related to follow-up care and any pertinent home health orders.      Planned Readmission: no    Discharge Medications:  See after visit summary for reconciled discharge medications provided to patient and/or family.      Administrative Statements   Discharge Statement:  I have spent a total time of 50 minutes in caring for this patient on the day of the visit/encounter.

## 2024-10-21 NOTE — PLAN OF CARE
Problem: PAIN - ADULT  Goal: Verbalizes/displays adequate comfort level or baseline comfort level  Description: Interventions:  - Encourage patient to monitor pain and request assistance  - Assess pain using appropriate pain scale  - Administer analgesics based on type and severity of pain and evaluate response  - Implement non-pharmacological measures as appropriate and evaluate response  - Consider cultural and social influences on pain and pain management  - Notify physician/advanced practitioner if interventions unsuccessful or patient reports new pain  Outcome: Progressing     Problem: INFECTION - ADULT  Goal: Absence or prevention of progression during hospitalization  Description: INTERVENTIONS:  - Assess and monitor for signs and symptoms of infection  - Monitor lab/diagnostic results  - Monitor all insertion sites, i.e. indwelling lines, tubes, and drains  - Monitor endotracheal if appropriate and nasal secretions for changes in amount and color  - [place] appropriate cooling/warming therapies per order  - Administer medications as ordered  - Instruct and encourage patient and family to use good hand hygiene technique  - Identify and instruct in appropriate isolation precautions for identified infection/condition  Outcome: Progressing  Goal: Absence of fever/infection during neutropenic period  Description: INTERVENTIONS:  - Monitor WBC    Outcome: Progressing     Problem: SAFETY ADULT  Goal: Patient will remain free of falls  Description: INTERVENTIONS:  - Educate patient/family on patient safety including physical limitations  - Instruct patient to call for assistance with activity   - Consult OT/PT to assist with strengthening/mobility   - Keep Call bell within reach  - Keep bed low and locked with side rails adjusted as appropriate  - Keep care items and personal belongings within reach  - Initiate and maintain comfort rounds  - Make Fall Risk Sign visible to staff  - Apply yellow socks and bracelet  for high fall risk patients  - Consider moving patient to room near nurses station  Outcome: Progressing  Goal: Maintain or return to baseline ADL function  Description: INTERVENTIONS:  -  Assess patient's ability to carry out ADLs; assess patient's baseline for ADL function and identify physical deficits which impact ability to perform ADLs (bathing, care of mouth/teeth, toileting, grooming, dressing, etc.)  - Assess/evaluate cause of self-care deficits   - Assess range of motion  - Assess patient's mobility; develop plan if impaired  - Assess patient's need for assistive devices and provide as appropriate  - Encourage maximum independence but intervene and supervise when necessary  - Involve family in performance of ADLs  - Assess for home care needs following discharge   - Consider OT consult to assist with ADL evaluation and planning for discharge  - Provide patient education as appropriate  Outcome: Progressing  Goal: Maintains/Returns to pre admission functional level  Description: INTERVENTIONS:  - Perform AM-PAC 6 Click Basic Mobility/ Daily Activity assessment daily.  - Set and communicate daily mobility goal to care team and patient/family/caregiver.   - Collaborate with rehabilitation services on mobility goals if consulted  - Perform Range of Motion 3 times a day.  - Reposition patient every 3 hours.  - Dangle patient 3 times a day  - Stand patient 3 times a day  - Ambulate patient 3 times a day  - Out of bed to chair 3 times a day   - Out of bed for meals 3 times a day  - Out of bed for toileting  - Record patient progress and toleration of activity level   Outcome: Progressing     Problem: DISCHARGE PLANNING  Goal: Discharge to home or other facility with appropriate resources  Description: INTERVENTIONS:  - Identify barriers to discharge w/patient and caregiver  - Arrange for needed discharge resources and transportation as appropriate  - Identify discharge learning needs (meds, wound care, etc.)  -  Arrange for interpretive services to assist at discharge as needed  - Refer to Case Management Department for coordinating discharge planning if the patient needs post-hospital services based on physician/advanced practitioner order or complex needs related to functional status, cognitive ability, or social support system  Outcome: Progressing     Problem: Knowledge Deficit  Goal: Patient/family/caregiver demonstrates understanding of disease process, treatment plan, medications, and discharge instructions  Description: Complete learning assessment and assess knowledge base.  Interventions:  - Provide teaching at level of understanding  - Provide teaching via preferred learning methods  Outcome: Progressing

## 2024-10-21 NOTE — ASSESSMENT & PLAN NOTE
Patient had prior sentinel lymph node biopsy of left inguinal region on 5/22/2024 at time of his melanoma excision.  Now presenting with several days of subjective fevers and acute pain of the inguinal area, with what appears to be a small wound with bleeding. Surgery evaluated patient and felt to be a draining carbuncle.  They performed a bedside incision and drainage of the abscess cavity was about 4 cm in size, expressed bloody drainage which was sent for culture.  Patient is currently afebrile, hemodynamically stable. Culture is growing Group B strep. Blood cultures no growth. Folliculitis and lower extremity/venous stasis wounds are risk factors for infection  -stop Vancomycin  -stop PO Keflex 1g every 6 hours  -continue a 10 day course of antibiotics from I&D, through 10/28/24  -local wound care and packing per general surgery  -Monitor for fevers

## 2024-10-21 NOTE — PLAN OF CARE
Problem: PAIN - ADULT  Goal: Verbalizes/displays adequate comfort level or baseline comfort level  Description: Interventions:  - Encourage patient to monitor pain and request assistance  - Assess pain using appropriate pain scale  - Administer analgesics based on type and severity of pain and evaluate response  - Implement non-pharmacological measures as appropriate and evaluate response  - Consider cultural and social influences on pain and pain management  - Notify physician/advanced practitioner if interventions unsuccessful or patient reports new pain  10/21/2024 1522 by Carlos Eduardo Tipton RN  Outcome: Adequate for Discharge  10/21/2024 0725 by Carlos Eduardo Tipton RN  Outcome: Progressing     Problem: INFECTION - ADULT  Goal: Absence or prevention of progression during hospitalization  Description: INTERVENTIONS:  - Assess and monitor for signs and symptoms of infection  - Monitor lab/diagnostic results  - Monitor all insertion sites, i.e. indwelling lines, tubes, and drains  - Monitor endotracheal if appropriate and nasal secretions for changes in amount and color  - [place] appropriate cooling/warming therapies per order  - Administer medications as ordered  - Instruct and encourage patient and family to use good hand hygiene technique  - Identify and instruct in appropriate isolation precautions for identified infection/condition  10/21/2024 1522 by Carlos Eduardo Tipton RN  Outcome: Adequate for Discharge  10/21/2024 0725 by Carlos Eduardo Tipton RN  Outcome: Progressing  Goal: Absence of fever/infection during neutropenic period  Description: INTERVENTIONS:  - Monitor WBC    10/21/2024 1522 by Carlos Eduardo Tipton RN  Outcome: Adequate for Discharge  10/21/2024 0725 by Carlos Eduardo Tipton RN  Outcome: Progressing     Problem: SAFETY ADULT  Goal: Patient will remain free of falls  Description: INTERVENTIONS:  - Educate patient/family on patient safety including physical limitations  - Instruct patient to call for  assistance with activity   - Consult OT/PT to assist with strengthening/mobility   - Keep Call bell within reach  - Keep bed low and locked with side rails adjusted as appropriate  - Keep care items and personal belongings within reach  - Initiate and maintain comfort rounds  - Make Fall Risk Sign visible to staff  - Apply yellow socks and bracelet for high fall risk patients  - Consider moving patient to room near nurses station  10/21/2024 1522 by Carlos Eduardo Tipton RN  Outcome: Adequate for Discharge  10/21/2024 0725 by Carlos Eduardo Tipton RN  Outcome: Progressing  Goal: Maintain or return to baseline ADL function  Description: INTERVENTIONS:  -  Assess patient's ability to carry out ADLs; assess patient's baseline for ADL function and identify physical deficits which impact ability to perform ADLs (bathing, care of mouth/teeth, toileting, grooming, dressing, etc.)  - Assess/evaluate cause of self-care deficits   - Assess range of motion  - Assess patient's mobility; develop plan if impaired  - Assess patient's need for assistive devices and provide as appropriate  - Encourage maximum independence but intervene and supervise when necessary  - Involve family in performance of ADLs  - Assess for home care needs following discharge   - Consider OT consult to assist with ADL evaluation and planning for discharge  - Provide patient education as appropriate  10/21/2024 1522 by Carlos Eduardo Tipton RN  Outcome: Adequate for Discharge  10/21/2024 0725 by Carlos Eduardo Tipton RN  Outcome: Progressing  Goal: Maintains/Returns to pre admission functional level  Description: INTERVENTIONS:  - Perform AM-PAC 6 Click Basic Mobility/ Daily Activity assessment daily.  - Set and communicate daily mobility goal to care team and patient/family/caregiver.   - Collaborate with rehabilitation services on mobility goals if consulted  - Perform Range of Motion 3 times a day.  - Reposition patient every 3 hours.  - Dangle patient 3 times a day  -  Stand patient 3 times a day  - Ambulate patient 3 times a day  - Out of bed to chair 3 times a day   - Out of bed for meals 3 times a day  - Out of bed for toileting  - Record patient progress and toleration of activity level   10/21/2024 1522 by Carlos Eduardo Tipton RN  Outcome: Adequate for Discharge  10/21/2024 0725 by Carlos Eduardo Tipton RN  Outcome: Progressing     Problem: DISCHARGE PLANNING  Goal: Discharge to home or other facility with appropriate resources  Description: INTERVENTIONS:  - Identify barriers to discharge w/patient and caregiver  - Arrange for needed discharge resources and transportation as appropriate  - Identify discharge learning needs (meds, wound care, etc.)  - Arrange for interpretive services to assist at discharge as needed  - Refer to Case Management Department for coordinating discharge planning if the patient needs post-hospital services based on physician/advanced practitioner order or complex needs related to functional status, cognitive ability, or social support system  10/21/2024 1522 by Carlos Eduardo Tipton RN  Outcome: Adequate for Discharge  10/21/2024 0725 by Carlos Eduardo Tipton RN  Outcome: Progressing     Problem: Knowledge Deficit  Goal: Patient/family/caregiver demonstrates understanding of disease process, treatment plan, medications, and discharge instructions  Description: Complete learning assessment and assess knowledge base.  Interventions:  - Provide teaching at level of understanding  - Provide teaching via preferred learning methods  10/21/2024 1522 by Carlos Eduardo Tipton RN  Outcome: Adequate for Discharge  10/21/2024 0725 by Carlos Eduardo Tipton RN  Outcome: Progressing

## 2024-10-21 NOTE — PROGRESS NOTES
Progress Note - Infectious Disease   Name: aD Rogel 54 y.o. male I MRN: [id number]  Unit/Bed#: E5 -01 I Date of Admission: 10/18/2024   Date of Service: 10/21/2024 I Hospital Day: 3     Assessment & Plan  Abscess of left groin  Patient had prior sentinel lymph node biopsy of left inguinal region on 5/22/2024 at time of his melanoma excision.  Now presenting with several days of subjective fevers and acute pain of the inguinal area, with what appears to be a small wound with bleeding. Surgery evaluated patient and felt to be a draining carbuncle.  They performed a bedside incision and drainage of the abscess cavity was about 4 cm in size, expressed bloody drainage which was sent for culture.  Patient is currently afebrile, hemodynamically stable. Culture is growing Group B strep. Blood cultures no growth. 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  Status post surgical excision in OR on 5/22/2024.  It does not appear he follows with oncology or is on any systemic therapy at this time.  Morbid obesity (HCC)  Patient with BMI over 51.  This effects antibiotic dosing    Venous stasis  Risk factor for infection and poor wound healing  Above management plan to transition to PO Keflex discussed with Dr. Camarena. Discussed with the patient and family at bedside. Okay for discharge from ID perspective.    Antibiotics:  Vancomycin day 4    Subjective   The patient is feeling well. Denies pain in the groin, fever, chills, Packing changed today. He wants to go home.    Objective :  Temp:  [97.9 °F (36.6 °C)-98.3 °F (36.8 °C)] 98.3 °F (36.8 °C)  HR:  [100-110] 103  BP: (141-195)/()  148/110  Resp:  [18-20] 20  SpO2:  [93 %-99 %] 98 %  O2 Device: None (Room air)    General:  No acute distress  Psychiatric:  Awake and alert  Pulmonary:  Normal respiratory excursion without accessory muscle use  Abdomen:  Soft, nontender  Extremities:  Bilateral leg edema, lower extremity venous stasis changes  Skin:  Folliculitis lower abdomen. Left groin incision with guaze packing in place, no surrounding erythema.    Lab Results: I have reviewed the following results:  Results from last 7 days   Lab Units 10/21/24  0718 10/20/24  0520 10/19/24  0610   WBC Thousand/uL 11.38* 10.98* 9.95   HEMOGLOBIN g/dL 14.7 14.3 13.4   PLATELETS Thousands/uL 377 395* 313     Results from last 7 days   Lab Units 10/21/24  0718 10/20/24  0520 10/19/24  0610 10/18/24  1439   SODIUM mmol/L 138 137 138 137   POTASSIUM mmol/L 4.2 3.9 4.0 3.4*   CHLORIDE mmol/L 103 105 106 101   CO2 mmol/L 30 27 26 30   BUN mg/dL 12 12 14 9   CREATININE mg/dL 0.64 0.61 0.72 0.70   EGFR ml/min/1.73sq m 112 114 107 108   CALCIUM mg/dL 9.0 9.2 8.6 9.1   AST U/L  --   --   --  15   ALT U/L  --   --   --  30   ALK PHOS U/L  --   --   --  78   ALBUMIN g/dL  --   --   --  3.7     Results from last 7 days   Lab Units 10/19/24  1453 10/18/24  1440 10/18/24  1439   BLOOD CULTURE   --  No Growth at 48 hrs. No Growth at 48 hrs.   GRAM STAIN RESULT  1+ Polys  No bacteria seen  --   --    WOUND CULTURE  1+ Growth of Streptococcus agalactiae (Group B)*  --   --      Results from last 7 days   Lab Units 10/18/24  1439   PROCALCITONIN ng/ml 0.09                 CT A/P-Left inguinal inflammatory process consistent with celluliti

## 2024-10-22 NOTE — RESTORATIVE TECHNICIAN NOTE
Restorative Technician Note      Patient Name: Da Rogel     Restorative Tech Visit Date: 10/22/24  Note Type: Mobility  Patient Position Upon Consult: Bedside chair  Activity Performed: Ambulated; Range of motion  Patient Position at End of Consult: All needs within reach; Bedside chair

## 2024-10-23 LAB
BACTERIA BLD CULT: NORMAL
BACTERIA BLD CULT: NORMAL

## 2024-10-23 NOTE — UTILIZATION REVIEW
NOTIFICATION OF ADMISSION DISCHARGE   This is a Notification of Discharge from [facility]. Please be advised that this patient has been discharge from our facility. Below you will find the admission and discharge date and time including the patient’s disposition.   UTILIZATION REVIEW CONTACT:  Sonia Dunbar MA  Utilization   Network Utilization Review Department  Phone: [phone] x carefully listen to the prompts. All voicemails are confidential.  Email: [email or web address]     ADMISSION INFORMATION  PRESENTATION DATE: 10/18/2024  1:16 PM  OBERVATION ADMISSION DATE: N/A  INPATIENT ADMISSION DATE: 10/18/24  6:08 PM   DISCHARGE DATE: 10/21/2024  4:18 PM   DISPOSITION:Home/Self Care    Network Utilization Review Department  ATTENTION: Please call with any questions or concerns to [phone] and carefully listen to the prompts so that you are directed to the right person. All voicemails are confidential.   For Discharge needs, contact Care Management DC Support Team at [phone] opt. 2  Send all requests for admission clinical reviews, approved or denied determinations and any other requests to dedicated fax number below belonging to the campus where the patient is receiving treatment. List of dedicated fax numbers for the Facilities:  FACILITY NAME UR FAX NUMBER   ADMISSION DENIALS (Administrative/Medical Necessity) [phone]   DISCHARGE SUPPORT TEAM ([facility]) [phone]   PARENT CHILD HEALTH (Maternity/NICU/Pediatrics) [phone]   [facility] [phone]   [facility] [phone]   [facility] [phone]   [facility] [phone]   [facility] [phone]   [facility] [phone]   [facility] [phone]   [facility]  [phone]   [facility] [phone]   Critical access hospital [phone]   [facility] [phone]   [facility] [phone]

## 2024-11-04 ENCOUNTER — TELEPHONE (OUTPATIENT)
Dept: SURGICAL ONCOLOGY | Facility: CLINIC | Age: 54
End: 2024-11-04

## 2025-01-03 ENCOUNTER — TELEPHONE (OUTPATIENT)
Dept: HEMATOLOGY ONCOLOGY | Facility: CLINIC | Age: 55
End: 2025-01-03

## 2025-01-03 NOTE — TELEPHONE ENCOUNTER
Attempted to call patient to inform him of the NO SHOW from 1/3/2025 with Dr. Cutler. Patient phone is not in service and unable to leave voicemail.

## 2025-01-18 ENCOUNTER — OFFICE VISIT (OUTPATIENT)
Dept: URGENT CARE | Facility: MEDICAL CENTER | Age: 55
End: 2025-01-18

## 2025-01-18 VITALS
SYSTOLIC BLOOD PRESSURE: 160 MMHG | TEMPERATURE: 98.4 F | OXYGEN SATURATION: 98 % | HEART RATE: 94 BPM | RESPIRATION RATE: 18 BRPM | DIASTOLIC BLOOD PRESSURE: 102 MMHG

## 2025-01-18 DIAGNOSIS — I87.2 VENOUS STASIS DERMATITIS: ICD-10-CM

## 2025-01-18 DIAGNOSIS — L03.116 CELLULITIS OF LEFT LOWER EXTREMITY: Primary | ICD-10-CM

## 2025-01-18 DIAGNOSIS — I10 ELEVATED BLOOD PRESSURE READING IN OFFICE WITH DIAGNOSIS OF HYPERTENSION: ICD-10-CM

## 2025-01-18 RX ORDER — CEPHALEXIN 500 MG/1
500 CAPSULE ORAL EVERY 8 HOURS SCHEDULED
Qty: 21 CAPSULE | Refills: 0 | Status: SHIPPED | OUTPATIENT
Start: 2025-01-18 | End: 2025-01-18 | Stop reason: CLARIF

## 2025-01-18 RX ORDER — CEPHALEXIN 500 MG/1
500 CAPSULE ORAL EVERY 8 HOURS SCHEDULED
Qty: 21 CAPSULE | Refills: 0 | Status: SHIPPED | OUTPATIENT
Start: 2025-01-18 | End: 2025-01-25

## 2025-01-18 NOTE — PATIENT INSTRUCTIONS
Prescribed course of Keflex, take as directed.  If you do not see any improvement within 1-2 days, or your symptoms worsen, proceed to the ER for further evaluation.  Recommend elevation, compression socks.  May apply moisturizers/emollients, such as Eucerin, Aquaphor, Vaseline, to area.  Call your PCP to schedule follow up regarding this issue, as well as your elevated blood pressure.    Follow up with PCP in 3-5 days.  Proceed to  ER if symptoms worsen.    If tests are performed, our office will contact you with results only if changes need to made to the care plan discussed with you at the visit. You can review your full results on [facility].    Patient Education     Cellulitis and erysipelas (skin infections)   The Basics   Written by the doctors and editors at [facility]   What are cellulitis and erysipelas? -- Cellulitis and erysipelas are both infections of the skin. These infections can cause redness, pain, and swelling. The difference between them is that erysipelas tends to affect the upper layers of skin, and cellulitis tends to affect deep layers of skin and sometimes the fat under the skin.  Cellulitis and erysipelas can happen when germs get into the skin. Normally, different types of germs live on a person's skin. Most of the time, these germs do not cause any problems. But if a person gets a cut or a break in the skin, the germs can get into the skin and cause an infection.  Certain conditions can increase a person's chance of getting cellulitis or erysipelas. These include:   Having a cut (even a tiny one)   Having another type of skin infection or a long-term skin condition   Having swelling of the skin or swelling in the body   Being overweight  What are the symptoms of cellulitis and erysipelas? -- Both types of infection cause very similar symptoms. Either infection can cause the infected area to be painful, red, swollen, or warm. Some people with cellulitis or erysipelas can sometimes  "also have fever or chills. And sometimes, people with these infections have no symptoms or only some of these symptoms.  Most of the time, cellulitis and erysipelas happen on the legs or arms. But people can get these infections in other places, such as the belly, face, in the mouth, or around the anus.  Will I need tests? -- Most people do not need any tests. Your doctor or nurse will do an exam and look at your skin.  It's important for a doctor or nurse to do an exam to figure out what kind of infection you have. The right treatment for a skin infection depends on the type of infection it is and which germs are causing it. Your doctor or nurse might need to do tests to figure out the cause of your infection.  If you have cellulitis or erysipelas, it's important to get treated. These infections can spread to the whole body and become serious if not treated.  How are cellulitis and erysipelas treated? -- These infections are treated with antibiotic pills. If your doctor prescribes medicine for you to take at home, it is important to follow the directions exactly. Take all of the pills you are given, even if you feel better before you finish them. If you do not take all the pills, the infection can come back worse.  People who have severe infections might be treated in the hospital and given antibiotics through a thin tube that goes into a vein, called an \"IV\".  What can I do to help treat my infection? -- You can:   Raise your arm or leg (if your infection is on your arm or leg) to reduce swelling - Raise the arm or leg up above the level of your heart 3 or 4 times a day, for 30 minutes each time.   Keep the infected area clean and dry - You can take a shower or bath, but be sure to pat the area dry with a towel afterward.  Antibiotic ointments or creams do not work to treat cellulitis and erysipelas.  Should I call my doctor or nurse? -- You should call your doctor or nurse if your symptoms do not get better " within 3 days of starting treatment. You should also call if the affected area gets:   Bigger   More swollen   More painful  Your doctor or nurse might do another exam or tests to see if you need different medicines.  Can skin infections be prevented? -- Sometimes. If you cut your skin, make sure to wash the area well with soap and water. This can help prevent the area from getting infected. If you have a long-term skin condition, ask your doctor or nurse what you can do to help prevent infections.  All topics are updated as new evidence becomes available and our peer review process is complete.  This topic retrieved from [employer] on: Feb 26, 2024.  Topic 43763 Version 15.0  Release: 32.2.4 - C32.56  © 2024 UpToDate, Inc. and/or its affiliates. All rights reserved.  Consumer Information Use and Disclaimer   Disclaimer: This generalized information is a limited summary of diagnosis, treatment, and/or medication information. It is not meant to be comprehensive and should be used as a tool to help the user understand and/or assess potential diagnostic and treatment options. It does NOT include all information about conditions, treatments, medications, side effects, or risks that may apply to a specific patient. It is not intended to be medical advice or a substitute for the medical advice, diagnosis, or treatment of a health care provider based on the health care provider's examination and assessment of a patient's specific and unique circumstances. Patients must speak with a health care provider for complete information about their health, medical questions, and treatment options, including any risks or benefits regarding use of medications. This information does not endorse any treatments or medications as safe, effective, or approved for treating a specific patient. Up[employer]Date, Inc. and its affiliates disclaim any warranty or liability relating to this information or the use thereof.The use of this information is  governed by the Terms of Use, available at https://www.woltersBabyoyeuwer.com/en/know/clinical-effectiveness-terms. 2024© [employer] and its affiliates and/or licensors. All rights reserved.  Copyright   © 2024 [employer] and/or its affiliates. All rights reserved.
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
## 2025-02-11 ENCOUNTER — OFFICE VISIT (OUTPATIENT)
Dept: URGENT CARE | Facility: MEDICAL CENTER | Age: 55
End: 2025-02-11
Payer: COMMERCIAL

## 2025-02-11 ENCOUNTER — APPOINTMENT (EMERGENCY)
Dept: RADIOLOGY | Facility: HOSPITAL | Age: 55
DRG: 195 | End: 2025-02-11
Payer: COMMERCIAL

## 2025-02-11 ENCOUNTER — HOSPITAL ENCOUNTER (INPATIENT)
Facility: HOSPITAL | Age: 55
LOS: 1 days | Discharge: HOME | DRG: 195 | End: 2025-02-14
Attending: EMERGENCY MEDICINE | Admitting: INTERNAL MEDICINE
Payer: COMMERCIAL

## 2025-02-11 VITALS
HEART RATE: 129 BPM | RESPIRATION RATE: 26 BRPM | HEIGHT: 72 IN | SYSTOLIC BLOOD PRESSURE: 200 MMHG | DIASTOLIC BLOOD PRESSURE: 118 MMHG | WEIGHT: 315 LBS | BODY MASS INDEX: 42.66 KG/M2 | OXYGEN SATURATION: 95 % | TEMPERATURE: 102.3 F

## 2025-02-11 DIAGNOSIS — R06.02 SHORTNESS OF BREATH: ICD-10-CM

## 2025-02-11 DIAGNOSIS — G47.33 OSA (OBSTRUCTIVE SLEEP APNEA): ICD-10-CM

## 2025-02-11 DIAGNOSIS — I10 PRIMARY HYPERTENSION: ICD-10-CM

## 2025-02-11 DIAGNOSIS — J18.9 PNEUMONIA: Primary | ICD-10-CM

## 2025-02-11 DIAGNOSIS — E66.01 MORBID OBESITY (HCC): ICD-10-CM

## 2025-02-11 DIAGNOSIS — J10.1 INFLUENZA A: ICD-10-CM

## 2025-02-11 DIAGNOSIS — I10 ELEVATED BLOOD PRESSURE READING IN OFFICE WITH DIAGNOSIS OF HYPERTENSION: Primary | ICD-10-CM

## 2025-02-11 DIAGNOSIS — I27.21 PULMONARY ARTERIAL HYPERTENSION (HCC): ICD-10-CM

## 2025-02-11 DIAGNOSIS — R09.02 HYPOXIA: ICD-10-CM

## 2025-02-11 DIAGNOSIS — E11.9 TYPE 2 DIABETES MELLITUS WITHOUT COMPLICATION, WITHOUT LONG-TERM CURRENT USE OF INSULIN (HCC): ICD-10-CM

## 2025-02-11 LAB
2HR DELTA HS TROPONIN: 0 NG/L
ALBUMIN SERPL BCG-MCNC: 3.9 G/DL (ref 3.5–5)
ALP SERPL-CCNC: 74 U/L (ref 34–104)
ALT SERPL W P-5'-P-CCNC: 21 U/L (ref 7–52)
ANION GAP SERPL CALCULATED.3IONS-SCNC: 6 MMOL/L (ref 4–13)
AST SERPL W P-5'-P-CCNC: 16 U/L (ref 13–39)
ATRIAL RATE: 107 BPM
ATRIAL RATE: 116 BPM
BASOPHILS # BLD AUTO: 0.07 THOUSANDS/ÂΜL (ref 0–0.1)
BASOPHILS NFR BLD AUTO: 1 % (ref 0–1)
BILIRUB SERPL-MCNC: 0.62 MG/DL (ref 0.2–1)
BNP SERPL-MCNC: 73 PG/ML (ref 0–100)
BUN SERPL-MCNC: 12 MG/DL (ref 5–25)
CALCIUM SERPL-MCNC: 8.9 MG/DL (ref 8.4–10.2)
CARDIAC TROPONIN I PNL SERPL HS: 10 NG/L (ref ?–50)
CARDIAC TROPONIN I PNL SERPL HS: 10 NG/L (ref ?–50)
CHLORIDE SERPL-SCNC: 103 MMOL/L (ref 96–108)
CO2 SERPL-SCNC: 27 MMOL/L (ref 21–32)
CREAT SERPL-MCNC: 0.73 MG/DL (ref 0.6–1.3)
EOSINOPHIL # BLD AUTO: 0.06 THOUSAND/ÂΜL (ref 0–0.61)
EOSINOPHIL NFR BLD AUTO: 1 % (ref 0–6)
ERYTHROCYTE [DISTWIDTH] IN BLOOD BY AUTOMATED COUNT: 14 % (ref 11.6–15.1)
FLUAV RNA RESP QL NAA+PROBE: POSITIVE
FLUBV RNA RESP QL NAA+PROBE: NEGATIVE
GFR SERPL CREATININE-BSD FRML MDRD: 106 ML/MIN/1.73SQ M
GLUCOSE SERPL-MCNC: 115 MG/DL (ref 65–140)
HCT VFR BLD AUTO: 44.4 % (ref 36.5–49.3)
HGB BLD-MCNC: 14.6 G/DL (ref 12–17)
IMM GRANULOCYTES # BLD AUTO: 0.05 THOUSAND/UL (ref 0–0.2)
IMM GRANULOCYTES NFR BLD AUTO: 1 % (ref 0–2)
LACTATE SERPL-SCNC: 0.7 MMOL/L (ref 0.5–2)
LYMPHOCYTES # BLD AUTO: 0.93 THOUSANDS/ÂΜL (ref 0.6–4.47)
LYMPHOCYTES NFR BLD AUTO: 12 % (ref 14–44)
MCH RBC QN AUTO: 27.7 PG (ref 26.8–34.3)
MCHC RBC AUTO-ENTMCNC: 32.9 G/DL (ref 31.4–37.4)
MCV RBC AUTO: 84 FL (ref 82–98)
MONOCYTES # BLD AUTO: 1.37 THOUSAND/ÂΜL (ref 0.17–1.22)
MONOCYTES NFR BLD AUTO: 18 % (ref 4–12)
NEUTROPHILS # BLD AUTO: 5.24 THOUSANDS/ÂΜL (ref 1.85–7.62)
NEUTS SEG NFR BLD AUTO: 67 % (ref 43–75)
NRBC BLD AUTO-RTO: 0 /100 WBCS
P AXIS: 53 DEGREES
PLATELET # BLD AUTO: 235 THOUSANDS/UL (ref 149–390)
PMV BLD AUTO: 9.3 FL (ref 8.9–12.7)
POTASSIUM SERPL-SCNC: 3.7 MMOL/L (ref 3.5–5.3)
PR INTERVAL: 188 MS
PROCALCITONIN SERPL-MCNC: 0.12 NG/ML
PROT SERPL-MCNC: 7.2 G/DL (ref 6.4–8.4)
QRS AXIS: 48 DEGREES
QRS AXIS: 51 DEGREES
QRSD INTERVAL: 100 MS
QRSD INTERVAL: 94 MS
QT INTERVAL: 304 MS
QT INTERVAL: 342 MS
QTC INTERVAL: 422 MS
QTC INTERVAL: 441 MS
RBC # BLD AUTO: 5.28 MILLION/UL (ref 3.88–5.62)
RSV RNA RESP QL NAA+PROBE: NEGATIVE
SARS-COV-2 RNA RESP QL NAA+PROBE: NEGATIVE
SODIUM SERPL-SCNC: 136 MMOL/L (ref 135–147)
T WAVE AXIS: 15 DEGREES
T WAVE AXIS: 21 DEGREES
VENTRICULAR RATE: 100 BPM
VENTRICULAR RATE: 116 BPM
WBC # BLD AUTO: 7.72 THOUSAND/UL (ref 4.31–10.16)

## 2025-02-11 PROCEDURE — 94640 AIRWAY INHALATION TREATMENT: CPT

## 2025-02-11 PROCEDURE — 85025 COMPLETE CBC W/AUTO DIFF WBC: CPT | Performed by: EMERGENCY MEDICINE

## 2025-02-11 PROCEDURE — 99285 EMERGENCY DEPT VISIT HI MDM: CPT | Performed by: EMERGENCY MEDICINE

## 2025-02-11 PROCEDURE — 36415 COLL VENOUS BLD VENIPUNCTURE: CPT | Performed by: EMERGENCY MEDICINE

## 2025-02-11 PROCEDURE — 96374 THER/PROPH/DIAG INJ IV PUSH: CPT

## 2025-02-11 PROCEDURE — 80053 COMPREHEN METABOLIC PANEL: CPT | Performed by: EMERGENCY MEDICINE

## 2025-02-11 PROCEDURE — 99285 EMERGENCY DEPT VISIT HI MDM: CPT

## 2025-02-11 PROCEDURE — 87040 BLOOD CULTURE FOR BACTERIA: CPT | Performed by: EMERGENCY MEDICINE

## 2025-02-11 PROCEDURE — 0241U HB NFCT DS VIR RESP RNA 4 TRGT: CPT | Performed by: INTERNAL MEDICINE

## 2025-02-11 PROCEDURE — 84484 ASSAY OF TROPONIN QUANT: CPT | Performed by: EMERGENCY MEDICINE

## 2025-02-11 PROCEDURE — 99215 OFFICE O/P EST HI 40 MIN: CPT

## 2025-02-11 PROCEDURE — 96361 HYDRATE IV INFUSION ADD-ON: CPT

## 2025-02-11 PROCEDURE — 83605 ASSAY OF LACTIC ACID: CPT | Performed by: EMERGENCY MEDICINE

## 2025-02-11 PROCEDURE — 99223 1ST HOSP IP/OBS HIGH 75: CPT | Performed by: INTERNAL MEDICINE

## 2025-02-11 PROCEDURE — 83880 ASSAY OF NATRIURETIC PEPTIDE: CPT | Performed by: EMERGENCY MEDICINE

## 2025-02-11 PROCEDURE — 93005 ELECTROCARDIOGRAM TRACING: CPT

## 2025-02-11 PROCEDURE — 93010 ELECTROCARDIOGRAM REPORT: CPT | Performed by: INTERNAL MEDICINE

## 2025-02-11 PROCEDURE — 84145 PROCALCITONIN (PCT): CPT | Performed by: INTERNAL MEDICINE

## 2025-02-11 PROCEDURE — 71045 X-RAY EXAM CHEST 1 VIEW: CPT

## 2025-02-11 PROCEDURE — 87040 BLOOD CULTURE FOR BACTERIA: CPT | Performed by: INTERNAL MEDICINE

## 2025-02-11 RX ORDER — ALBUTEROL SULFATE 90 UG/1
2 INHALANT RESPIRATORY (INHALATION) EVERY 4 HOURS PRN
Status: DISCONTINUED | OUTPATIENT
Start: 2025-02-11 | End: 2025-02-14 | Stop reason: HOSPADM

## 2025-02-11 RX ORDER — IPRATROPIUM BROMIDE AND ALBUTEROL SULFATE 2.5; .5 MG/3ML; MG/3ML
3 SOLUTION RESPIRATORY (INHALATION) ONCE
Status: COMPLETED | OUTPATIENT
Start: 2025-02-11 | End: 2025-02-11

## 2025-02-11 RX ORDER — AZITHROMYCIN 250 MG/1
500 TABLET, FILM COATED ORAL EVERY 24 HOURS
Status: DISCONTINUED | OUTPATIENT
Start: 2025-02-11 | End: 2025-02-13

## 2025-02-11 RX ORDER — ENOXAPARIN SODIUM 100 MG/ML
40 INJECTION SUBCUTANEOUS DAILY
Status: DISCONTINUED | OUTPATIENT
Start: 2025-02-12 | End: 2025-02-14 | Stop reason: HOSPADM

## 2025-02-11 RX ORDER — LEVALBUTEROL INHALATION SOLUTION 1.25 MG/3ML
1.25 SOLUTION RESPIRATORY (INHALATION)
Status: DISCONTINUED | OUTPATIENT
Start: 2025-02-11 | End: 2025-02-13

## 2025-02-11 RX ORDER — ONDANSETRON 2 MG/ML
4 INJECTION INTRAMUSCULAR; INTRAVENOUS EVERY 6 HOURS PRN
Status: DISCONTINUED | OUTPATIENT
Start: 2025-02-11 | End: 2025-02-14 | Stop reason: HOSPADM

## 2025-02-11 RX ORDER — AMLODIPINE BESYLATE 5 MG/1
5 TABLET ORAL DAILY
Status: DISCONTINUED | OUTPATIENT
Start: 2025-02-12 | End: 2025-02-12

## 2025-02-11 RX ORDER — LABETALOL HYDROCHLORIDE 5 MG/ML
10 INJECTION, SOLUTION INTRAVENOUS ONCE
Status: COMPLETED | OUTPATIENT
Start: 2025-02-11 | End: 2025-02-11

## 2025-02-11 RX ORDER — ACETAMINOPHEN 325 MG/1
975 TABLET ORAL ONCE
Status: COMPLETED | OUTPATIENT
Start: 2025-02-11 | End: 2025-02-11

## 2025-02-11 RX ORDER — GUAIFENESIN 600 MG/1
600 TABLET, EXTENDED RELEASE ORAL 2 TIMES DAILY
Status: DISCONTINUED | OUTPATIENT
Start: 2025-02-11 | End: 2025-02-13

## 2025-02-11 RX ORDER — LOSARTAN POTASSIUM 50 MG/1
100 TABLET ORAL EVERY MORNING
Status: DISCONTINUED | OUTPATIENT
Start: 2025-02-12 | End: 2025-02-14 | Stop reason: HOSPADM

## 2025-02-11 RX ORDER — ACETAMINOPHEN 325 MG/1
650 TABLET ORAL EVERY 6 HOURS PRN
Status: DISCONTINUED | OUTPATIENT
Start: 2025-02-11 | End: 2025-02-14 | Stop reason: HOSPADM

## 2025-02-11 RX ADMIN — ALBUTEROL SULFATE 2 PUFF: 90 AEROSOL, METERED RESPIRATORY (INHALATION) at 23:34

## 2025-02-11 RX ADMIN — SODIUM CHLORIDE 1000 ML: 0.9 INJECTION, SOLUTION INTRAVENOUS at 15:56

## 2025-02-11 RX ADMIN — IPRATROPIUM BROMIDE AND ALBUTEROL SULFATE 3 ML: .5; 3 SOLUTION RESPIRATORY (INHALATION) at 16:11

## 2025-02-11 RX ADMIN — ACETAMINOPHEN 975 MG: 325 TABLET, FILM COATED ORAL at 15:54

## 2025-02-11 RX ADMIN — CEFEPIME 2000 MG: 2 INJECTION, POWDER, FOR SOLUTION INTRAVENOUS at 18:31

## 2025-02-11 RX ADMIN — CEFTRIAXONE SODIUM 1000 MG: 10 INJECTION, POWDER, FOR SOLUTION INTRAVENOUS at 23:24

## 2025-02-11 RX ADMIN — GUAIFENESIN 600 MG: 600 TABLET, EXTENDED RELEASE ORAL at 20:38

## 2025-02-11 RX ADMIN — AZITHROMYCIN DIHYDRATE 500 MG: 250 TABLET ORAL at 20:38

## 2025-02-11 RX ADMIN — LABETALOL HYDROCHLORIDE 10 MG: 5 INJECTION, SOLUTION INTRAVENOUS at 15:57

## 2025-02-11 NOTE — ED NOTES
Food order placed, pt eating     Joann Manzanares RN  02/11/25 2951    
Pt aware urine specimen is needed     Joann Manzanares, RN  02/11/25 2353    
RN contacted lab due to scanning the flu/COVID off but then pt refused. Zahira from the lab handled it for BIPIN Manzanares RN  02/11/25 [id number]    
leg

## 2025-02-11 NOTE — LETTER
[place] [address]  [address] [place] [address]  Dept: [phone]    February 14, 2025     Patient: Da Rogel   YOB: 1970   Date of Visit: 2/11/2025       To Whom it May Concern:    Da Rogel is under my professional care. He was seen in the hospital from 2/11/2025 to 02/14/25. He may return to work on 2/18/2025 without limitations.    If you have any questions or concerns, please don't hesitate to call.         Sincerely,          Joe Black PA-C
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
## 2025-02-11 NOTE — ASSESSMENT & PLAN NOTE
Presented to ed with shortness of breath that was worse with exertion and fever  Cxr revealed focal increase in lung marking at left base- suggestive of viral or atypical lower respiratory infection  Will check covid/flu/rsv  Check procal  Given cefepime in ed. Will change to ceftriaxone and azithromycin  Check urine for legionella and strep  Check sputum culture  Consider ct chest if above is negative

## 2025-02-11 NOTE — ASSESSMENT & PLAN NOTE
Uncontrolled on admission   S/p labetalol   Bp improved to sbp of 140   Continue losartan   Pt had been on norvasc but currently not taking. Will restart at lower dose of 5mg and monitor
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
## 2025-02-12 PROBLEM — J10.00 PNEUMONIA DUE TO INFLUENZA A VIRUS: Status: ACTIVE | Noted: 2025-02-11

## 2025-02-12 LAB
ANION GAP SERPL CALCULATED.3IONS-SCNC: 9 MMOL/L (ref 4–13)
BUN SERPL-MCNC: 20 MG/DL (ref 5–25)
CALCIUM SERPL-MCNC: 8.9 MG/DL (ref 8.4–10.2)
CHLORIDE SERPL-SCNC: 104 MMOL/L (ref 96–108)
CO2 SERPL-SCNC: 25 MMOL/L (ref 21–32)
CREAT SERPL-MCNC: 0.89 MG/DL (ref 0.6–1.3)
ERYTHROCYTE [DISTWIDTH] IN BLOOD BY AUTOMATED COUNT: 14.5 % (ref 11.6–15.1)
GFR SERPL CREATININE-BSD FRML MDRD: 98 ML/MIN/1.73SQ M
GLUCOSE P FAST SERPL-MCNC: 155 MG/DL (ref 65–99)
GLUCOSE SERPL-MCNC: 155 MG/DL (ref 65–140)
HCT VFR BLD AUTO: 46 % (ref 36.5–49.3)
HGB BLD-MCNC: 15.1 G/DL (ref 12–17)
L PNEUMO1 AG UR QL IA.RAPID: NEGATIVE
MCH RBC QN AUTO: 28 PG (ref 26.8–34.3)
MCHC RBC AUTO-ENTMCNC: 32.8 G/DL (ref 31.4–37.4)
MCV RBC AUTO: 85 FL (ref 82–98)
PLATELET # BLD AUTO: 218 THOUSANDS/UL (ref 149–390)
PMV BLD AUTO: 9 FL (ref 8.9–12.7)
POTASSIUM SERPL-SCNC: 4 MMOL/L (ref 3.5–5.3)
PROCALCITONIN SERPL-MCNC: 0.09 NG/ML
RBC # BLD AUTO: 5.39 MILLION/UL (ref 3.88–5.62)
S PNEUM AG UR QL: NEGATIVE
SODIUM SERPL-SCNC: 138 MMOL/L (ref 135–147)
WBC # BLD AUTO: 7.46 THOUSAND/UL (ref 4.31–10.16)

## 2025-02-12 PROCEDURE — 94640 AIRWAY INHALATION TREATMENT: CPT

## 2025-02-12 PROCEDURE — 85027 COMPLETE CBC AUTOMATED: CPT | Performed by: INTERNAL MEDICINE

## 2025-02-12 PROCEDURE — 84145 PROCALCITONIN (PCT): CPT | Performed by: INTERNAL MEDICINE

## 2025-02-12 PROCEDURE — 99232 SBSQ HOSP IP/OBS MODERATE 35: CPT | Performed by: PHYSICIAN ASSISTANT

## 2025-02-12 PROCEDURE — 94760 N-INVAS EAR/PLS OXIMETRY 1: CPT

## 2025-02-12 PROCEDURE — 87449 NOS EACH ORGANISM AG IA: CPT | Performed by: INTERNAL MEDICINE

## 2025-02-12 PROCEDURE — 94664 DEMO&/EVAL PT USE INHALER: CPT

## 2025-02-12 PROCEDURE — 80048 BASIC METABOLIC PNL TOTAL CA: CPT | Performed by: INTERNAL MEDICINE

## 2025-02-12 RX ORDER — TEMAZEPAM 7.5 MG/1
7.5 CAPSULE ORAL
Status: DISCONTINUED | OUTPATIENT
Start: 2025-02-12 | End: 2025-02-14 | Stop reason: HOSPADM

## 2025-02-12 RX ORDER — LABETALOL HYDROCHLORIDE 5 MG/ML
20 INJECTION, SOLUTION INTRAVENOUS EVERY 4 HOURS PRN
Status: DISCONTINUED | OUTPATIENT
Start: 2025-02-12 | End: 2025-02-14 | Stop reason: HOSPADM

## 2025-02-12 RX ORDER — LABETALOL HYDROCHLORIDE 5 MG/ML
10 INJECTION, SOLUTION INTRAVENOUS EVERY 4 HOURS PRN
Status: DISCONTINUED | OUTPATIENT
Start: 2025-02-12 | End: 2025-02-14 | Stop reason: HOSPADM

## 2025-02-12 RX ORDER — AMLODIPINE BESYLATE 10 MG/1
10 TABLET ORAL DAILY
Status: DISCONTINUED | OUTPATIENT
Start: 2025-02-13 | End: 2025-02-14 | Stop reason: HOSPADM

## 2025-02-12 RX ORDER — OSELTAMIVIR PHOSPHATE 75 MG/1
75 CAPSULE ORAL EVERY 12 HOURS SCHEDULED
Status: DISCONTINUED | OUTPATIENT
Start: 2025-02-12 | End: 2025-02-14 | Stop reason: HOSPADM

## 2025-02-12 RX ADMIN — IPRATROPIUM BROMIDE 0.5 MG: 0.5 SOLUTION RESPIRATORY (INHALATION) at 07:19

## 2025-02-12 RX ADMIN — LOSARTAN POTASSIUM 100 MG: 50 TABLET, FILM COATED ORAL at 08:37

## 2025-02-12 RX ADMIN — AZITHROMYCIN DIHYDRATE 500 MG: 250 TABLET ORAL at 19:56

## 2025-02-12 RX ADMIN — CEFTRIAXONE SODIUM 1000 MG: 10 INJECTION, POWDER, FOR SOLUTION INTRAVENOUS at 22:23

## 2025-02-12 RX ADMIN — GUAIFENESIN 600 MG: 600 TABLET, EXTENDED RELEASE ORAL at 08:36

## 2025-02-12 RX ADMIN — ACETAMINOPHEN 650 MG: 325 TABLET, FILM COATED ORAL at 19:56

## 2025-02-12 RX ADMIN — OSELTAMIVIR PHOSPHATE 75 MG: 75 CAPSULE ORAL at 08:46

## 2025-02-12 RX ADMIN — LEVALBUTEROL HYDROCHLORIDE 1.25 MG: 1.25 SOLUTION RESPIRATORY (INHALATION) at 20:13

## 2025-02-12 RX ADMIN — OSELTAMIVIR PHOSPHATE 75 MG: 75 CAPSULE ORAL at 20:00

## 2025-02-12 RX ADMIN — GUAIFENESIN 600 MG: 600 TABLET, EXTENDED RELEASE ORAL at 17:10

## 2025-02-12 RX ADMIN — AMLODIPINE BESYLATE 5 MG: 5 TABLET ORAL at 08:37

## 2025-02-12 RX ADMIN — IPRATROPIUM BROMIDE 0.5 MG: 0.5 SOLUTION RESPIRATORY (INHALATION) at 13:20

## 2025-02-12 RX ADMIN — LEVALBUTEROL HYDROCHLORIDE 1.25 MG: 1.25 SOLUTION RESPIRATORY (INHALATION) at 13:20

## 2025-02-12 RX ADMIN — LEVALBUTEROL HYDROCHLORIDE 1.25 MG: 1.25 SOLUTION RESPIRATORY (INHALATION) at 07:19

## 2025-02-12 RX ADMIN — LABETALOL HYDROCHLORIDE 20 MG: 5 INJECTION, SOLUTION INTRAVENOUS at 15:55

## 2025-02-12 RX ADMIN — IPRATROPIUM BROMIDE 0.5 MG: 0.5 SOLUTION RESPIRATORY (INHALATION) at 20:13

## 2025-02-12 NOTE — ASSESSMENT & PLAN NOTE
Uncontrolled on admission   S/p labetalol   Bp improved to sbp of 140   Continue losartan and newly restarted 5mg amlodipine

## 2025-02-12 NOTE — ASSESSMENT & PLAN NOTE
Presented to ed with shortness of breath that was worse with exertion and fever  Cxr revealed focal increase in lung marking at left base- suggestive of viral or atypical lower respiratory infection  Influenza A (+)  Procalcitonin negative x 2  Urine strep/legionella pending  Sputum cx pending

## 2025-02-12 NOTE — QUICK NOTE
Patient noted to have elevated BP at 160/90.  Amlodipine increased to 10 mg daily.  PRN labetalol orders placed.

## 2025-02-12 NOTE — PROGRESS NOTES
Progress Note - Hospitalist   Name: aD Rogel 54 y.o. male I MRN: [id number]  Unit/Bed#: [place] [address] -01 I Date of Admission: 2/11/2025   Date of Service: 2/12/2025 I Hospital Day: 0    Assessment & Plan  Pneumonia due to influenza A virus  Presented to ed with shortness of breath that was worse with exertion and fever  Cxr revealed focal increase in lung marking at left base- suggestive of viral or atypical lower respiratory infection  Influenza A (+)  Procalcitonin negative x 2  Urine strep/legionella pending  Sputum cx pending  Malignant melanoma of torso excluding breast (HCC)  Stage 1b melanoma of back.   Continue outpt follow up with surgical oncology   HTN (hypertension)  Uncontrolled on admission   S/p labetalol   Bp improved to sbp of 140   Continue losartan and newly restarted 5mg amlodipine  Morbid obesity (HCC)  Encourage lifestyle changes to promote weight loss   MAXIMUS (obstructive sleep apnea)  Unable to tolerate cpap      VTE Pharmacologic Prophylaxis: VTE Score: 5 High Risk (Score >/= 5) - Pharmacological DVT Prophylaxis Ordered: enoxaparin (Lovenox). Sequential Compression Devices Ordered.    Mobility:   Basic Mobility Inpatient Raw Score: 24  JH-HLM Goal: 8: Walk 250 feet or more  JH-HLM Achieved: 7: Walk 25 feet or more  JH-HLM Goal NOT achieved. Continue with multidisciplinary rounding and encourage appropriate mobility to improve upon JH-HLM goals.    Patient Centered Rounds: I performed bedside rounds with nursing staff today.   Discussions with Specialists or Other Care Team Provider: None    Education and Discussions with Family / Patient: Patient declined call to .     Current Length of Stay: 0 day(s)  Current Patient Status: Observation   Certification Statement: The patient will continue to require additional inpatient hospital stay due to Tamiflu  Discharge Plan: Anticipate discharge in 24-48 hrs to home.    Code Status: Level 1 - Full Code    Subjective   Patient reports  feeling ill, reports cough, malaise, fatigue.    Objective :  Temp:  [98.5 °F (36.9 °C)-102.3 °F (39.1 °C)] 99.9 °F (37.7 °C)  HR:  [100-129] 100  BP: (127-215)/() 146/105  Resp:  [14-26] 16  SpO2:  [90 %-97 %] 96 %  O2 Device: None (Room air)    Body mass index is 51.46 kg/m².     Input and Output Summary (last 24 hours):     Intake/Output Summary (Last 24 hours) at 2/12/2025 1228  Last data filed at 2/12/2025 0851  Gross per 24 hour   Intake 1530 ml   Output 500 ml   Net 1030 ml       Physical Exam  Vitals and nursing note reviewed.   Constitutional:       Appearance: Normal appearance. He is ill-appearing.   HENT:      Head: Normocephalic and atraumatic.      Mouth/Throat:      Mouth: Mucous membranes are moist.      Pharynx: Oropharynx is clear. No oropharyngeal exudate.   Eyes:      Extraocular Movements: Extraocular movements intact.      Comments: Bilateral injections   Cardiovascular:      Rate and Rhythm: Normal rate and regular rhythm.      Pulses: Normal pulses.      Heart sounds: Normal heart sounds. No murmur heard.     No friction rub. No gallop.   Pulmonary:      Effort: Pulmonary effort is normal. No respiratory distress.      Breath sounds: No stridor. Rhonchi present. No wheezing or rales.   Abdominal:      General: Abdomen is flat. Bowel sounds are normal. There is no distension.      Palpations: Abdomen is soft.      Tenderness: There is no abdominal tenderness.   Musculoskeletal:      Right lower leg: No edema.      Left lower leg: No edema.   Skin:     General: Skin is warm and dry.   Neurological:      General: No focal deficit present.      Mental Status: He is alert and oriented to person, place, and time.           Lines/Drains:              Lab Results: I have reviewed the following results:   Results from last 7 days   Lab Units 02/12/25  0543 02/11/25  1552   WBC Thousand/uL 7.46 7.72   HEMOGLOBIN g/dL 15.1 14.6   HEMATOCRIT % 46.0 44.4   PLATELETS Thousands/uL 218 235   SEGS PCT %   --  67   LYMPHO PCT %  --  12*   MONO PCT %  --  18*   EOS PCT %  --  1     Results from last 7 days   Lab Units 02/12/25  0543 02/11/25  1552   SODIUM mmol/L 138 136   POTASSIUM mmol/L 4.0 3.7   CHLORIDE mmol/L 104 103   CO2 mmol/L 25 27   BUN mg/dL 20 12   CREATININE mg/dL 0.89 0.73   ANION GAP mmol/L 9 6   CALCIUM mg/dL 8.9 8.9   ALBUMIN g/dL  --  3.9   TOTAL BILIRUBIN mg/dL  --  0.62   ALK PHOS U/L  --  74   ALT U/L  --  21   AST U/L  --  16   GLUCOSE RANDOM mg/dL 155* 115                 Results from last 7 days   Lab Units 02/12/25  0543 02/11/25  1814   LACTIC ACID mmol/L  --  0.7   PROCALCITONIN ng/ml 0.09 0.12       Recent Cultures (last 7 days):   Results from last 7 days   Lab Units 02/11/25  1822 02/11/25  1814   BLOOD CULTURE  Received in Microbiology Lab. Culture in Progress. Received in Microbiology Lab. Culture in Progress.       Imaging Results Review: No pertinent imaging studies reviewed.  Other Study Results Review: No additional pertinent studies reviewed.    Last 24 Hours Medication List:     Current Facility-Administered Medications:     acetaminophen (TYLENOL) tablet 650 mg, Q6H PRN    albuterol (PROVENTIL HFA,VENTOLIN HFA) inhaler 2 puff, Q4H PRN    amLODIPine (NORVASC) tablet 5 mg, Daily    azithromycin (ZITHROMAX) tablet 500 mg, Q24H    cefTRIAXone (ROCEPHIN) 1,000 mg in dextrose 5 % 50 mL IVPB, Q24H, Last Rate: 1,000 mg (02/11/25 3124)    enoxaparin (LOVENOX) subcutaneous injection 40 mg, Daily    guaiFENesin (MUCINEX) 12 hr tablet 600 mg, BID    ipratropium (ATROVENT) 0.02 % inhalation solution 0.5 mg, TID    levalbuterol (XOPENEX) inhalation solution 1.25 mg, TID    losartan (COZAAR) tablet 100 mg, QAM    ondansetron (ZOFRAN) injection 4 mg, Q6H PRN    oseltamivir (TAMIFLU) capsule 75 mg, Q12H BRAD    Administrative Statements   Today, Patient Was Seen By: Axel Carbajal PA-C      **Please Note: This note may have been constructed using a voice recognition system.**

## 2025-02-13 PROBLEM — R93.89 ABNORMAL CXR: Status: ACTIVE | Noted: 2025-02-13

## 2025-02-13 PROBLEM — E11.9 TYPE 2 DIABETES MELLITUS, WITHOUT LONG-TERM CURRENT USE OF INSULIN (HCC): Status: ACTIVE | Noted: 2025-02-13

## 2025-02-13 PROBLEM — J10.1 INFLUENZA A: Status: ACTIVE | Noted: 2025-02-11

## 2025-02-13 PROBLEM — I27.21 PULMONARY ARTERIAL HYPERTENSION (HCC): Status: ACTIVE | Noted: 2025-02-13

## 2025-02-13 LAB
EST. AVERAGE GLUCOSE BLD GHB EST-MCNC: 146 MG/DL
GLUCOSE SERPL-MCNC: 203 MG/DL (ref 65–140)
GLUCOSE SERPL-MCNC: 214 MG/DL (ref 65–140)
HBA1C MFR BLD: 6.7 %

## 2025-02-13 PROCEDURE — 94760 N-INVAS EAR/PLS OXIMETRY 1: CPT

## 2025-02-13 PROCEDURE — 83036 HEMOGLOBIN GLYCOSYLATED A1C: CPT

## 2025-02-13 PROCEDURE — 94640 AIRWAY INHALATION TREATMENT: CPT

## 2025-02-13 PROCEDURE — 99232 SBSQ HOSP IP/OBS MODERATE 35: CPT

## 2025-02-13 PROCEDURE — 82948 REAGENT STRIP/BLOOD GLUCOSE: CPT

## 2025-02-13 RX ORDER — FUROSEMIDE 10 MG/ML
20 INJECTION INTRAMUSCULAR; INTRAVENOUS ONCE
Status: COMPLETED | OUTPATIENT
Start: 2025-02-13 | End: 2025-02-13

## 2025-02-13 RX ORDER — FUROSEMIDE 20 MG/1
20 TABLET ORAL DAILY
Status: DISCONTINUED | OUTPATIENT
Start: 2025-02-14 | End: 2025-02-14 | Stop reason: HOSPADM

## 2025-02-13 RX ORDER — GUAIFENESIN 600 MG/1
1200 TABLET, EXTENDED RELEASE ORAL 2 TIMES DAILY
Status: DISCONTINUED | OUTPATIENT
Start: 2025-02-13 | End: 2025-02-14 | Stop reason: HOSPADM

## 2025-02-13 RX ORDER — INSULIN LISPRO 100 [IU]/ML
1-5 INJECTION, SOLUTION INTRAVENOUS; SUBCUTANEOUS
Status: DISCONTINUED | OUTPATIENT
Start: 2025-02-13 | End: 2025-02-14 | Stop reason: HOSPADM

## 2025-02-13 RX ORDER — AZITHROMYCIN 250 MG/1
500 TABLET, FILM COATED ORAL EVERY 24 HOURS
Status: COMPLETED | OUTPATIENT
Start: 2025-02-13 | End: 2025-02-13

## 2025-02-13 RX ORDER — LEVALBUTEROL INHALATION SOLUTION 1.25 MG/3ML
1.25 SOLUTION RESPIRATORY (INHALATION) EVERY 6 HOURS PRN
Status: DISCONTINUED | OUTPATIENT
Start: 2025-02-13 | End: 2025-02-14 | Stop reason: HOSPADM

## 2025-02-13 RX ADMIN — INSULIN LISPRO 1 UNITS: 100 INJECTION, SOLUTION INTRAVENOUS; SUBCUTANEOUS at 22:29

## 2025-02-13 RX ADMIN — INSULIN LISPRO 1 UNITS: 100 INJECTION, SOLUTION INTRAVENOUS; SUBCUTANEOUS at 17:15

## 2025-02-13 RX ADMIN — GUAIFENESIN 1200 MG: 600 TABLET, EXTENDED RELEASE ORAL at 17:14

## 2025-02-13 RX ADMIN — OSELTAMIVIR PHOSPHATE 75 MG: 75 CAPSULE ORAL at 08:11

## 2025-02-13 RX ADMIN — FUROSEMIDE 20 MG: 10 INJECTION, SOLUTION INTRAVENOUS at 10:33

## 2025-02-13 RX ADMIN — AMLODIPINE BESYLATE 10 MG: 10 TABLET ORAL at 08:11

## 2025-02-13 RX ADMIN — IPRATROPIUM BROMIDE 0.5 MG: 0.5 SOLUTION RESPIRATORY (INHALATION) at 07:15

## 2025-02-13 RX ADMIN — LOSARTAN POTASSIUM 100 MG: 50 TABLET, FILM COATED ORAL at 08:11

## 2025-02-13 RX ADMIN — OSELTAMIVIR PHOSPHATE 75 MG: 75 CAPSULE ORAL at 20:11

## 2025-02-13 RX ADMIN — GUAIFENESIN 600 MG: 600 TABLET, EXTENDED RELEASE ORAL at 08:11

## 2025-02-13 RX ADMIN — IPRATROPIUM BROMIDE 0.5 MG: 0.5 SOLUTION RESPIRATORY (INHALATION) at 13:33

## 2025-02-13 RX ADMIN — LEVALBUTEROL HYDROCHLORIDE 1.25 MG: 1.25 SOLUTION RESPIRATORY (INHALATION) at 07:15

## 2025-02-13 RX ADMIN — AZITHROMYCIN DIHYDRATE 500 MG: 250 TABLET ORAL at 20:11

## 2025-02-13 RX ADMIN — LEVALBUTEROL HYDROCHLORIDE 1.25 MG: 1.25 SOLUTION RESPIRATORY (INHALATION) at 13:33

## 2025-02-13 NOTE — ASSESSMENT & PLAN NOTE
Suspected on CXR, suspect in setting of WHO group 2/3  Will obtain overnight sleep study  Used to be on Lasix 20 mg daily in setting of lower leg swelling, will give IV dose x 1 today and restart previous dose tomorrow

## 2025-02-13 NOTE — ASSESSMENT & PLAN NOTE
Reports he does not like the noise from his CPAP machine and does not wear this  Has been having desaturations overnight  Due to patients lower extremity swelling, chronic ELIZALDE and abnormal chest x-ray will check echocardiogram to evaluate pulmonary pressures and heart muscle structure/function  Plan for overnight home oxygen evaluation tonight, discussed with case management and patient

## 2025-02-13 NOTE — PROGRESS NOTES
Progress Note - Hospitalist   Name: Da Rogel 54 y.o. male I MRN: [id number]  Unit/Bed#: [place] [address] -01 I Date of Admission: 2/11/2025   Date of Service: 2/13/2025 I Hospital Day: 0    Assessment & Plan  Influenza A  Presented with progressive dyspnea on exertion, fever and congestion  Influenza A + 2/11/2025  Started on Tamiflu, continue day 2  Continue antitussives, will change nebulizer therapy over to as needed  Fortunately remains on room air  Abnormal CXR  Chest x-ray on admission with increased marking at left base suggestive of viral or atypical lower respiratory tract infection with no pneumonic consolidation  Procalcitonin negative, urine antigens negative  Complete 3-day course of azithromycin today, discontinue Rocephin  Also with bilateral hilar fullness with questionable pulmonary arterial hypertension, see below  HTN (hypertension)  Elevated on admission, continue losartan 100 mg daily  Patient was not taking amlodipine prior to admission and this dosing has been increased.  Will discontinue this due to his lower extremity swelling  Resuming home Lasix 20 mg daily  Continue IV labetalol as needed  BP improving today  Recommend low-salt diet, nutrition consult  Type 2 diabetes mellitus, without long-term current use of insulin ([facility])  Lab Results   Component Value Date    HGBA1C 6.7 (H) 02/13/2025     Recent A1c elevated at 6.7, start sliding scale insulin and Accu-Cheks  Patient and myself discussed his diet and the benefit of weight loss bedside  Will initiate patient on diabetic medication on discharge if he is agreeable  Nutrition consult  MAXIMUS (obstructive sleep apnea)  Reports he does not like the noise from his CPAP machine and does not wear this  Has been having desaturations overnight  Due to patients lower extremity swelling, chronic ELIZALDE and abnormal chest x-ray will check echocardiogram to evaluate pulmonary pressures and heart muscle structure/function  Plan for overnight home oxygen  evaluation tonight, discussed with case management and patient  Pulmonary arterial hypertension (HCC)  Suspected on CXR, suspect in setting of WHO group 2/3  Will obtain overnight sleep study  Used to be on Lasix 20 mg daily in setting of lower leg swelling, will give IV dose x 1 today and restart previous dose tomorrow  Malignant melanoma of torso excluding breast (HCC)  Stage 1b melanoma of back.   Continue outpt follow up with surgical oncology   Morbid obesity (HCC)  Encourage lifestyle changes to promote weight loss   A1c updated - new diabetic, see separate plan    VTE Pharmacologic Prophylaxis: VTE Score: 5 High Risk (Score >/= 5) - Pharmacological DVT Prophylaxis Ordered: enoxaparin (Lovenox). Sequential Compression Devices Ordered.    Mobility:   Basic Mobility Inpatient Raw Score: 23  JH-HLM Goal: 7: Walk 25 feet or more  JH-HLM Achieved: 6: Walk 10 steps or more  JH-HLM Goal achieved. Continue to encourage appropriate mobility.    Patient Centered Rounds: I performed bedside rounds with nursing staff today.   Discussions with Specialists or Other Care Team Provider: AMBREEN  Current Length of Stay: 0 day(s)  Current Patient Status: Observation   Certification Statement: The patient will continue to require additional inpatient hospital stay due to echo, diuresis  Discharge Plan: Anticipate discharge in 24-48 hrs to home.    Code Status: Level 1 - Full Code    Subjective   Patient seen and examined.  Reports he is improving from coming in but still has chest congestion and feels that he can bring up mucus.  No fevers or chills, current chest pain.  He is short of breath but notes this is a chronic problem for him. He does also have lower leg swelling. Used to be on water pill dosed at 20 mg a day.     Objective :  Temp:  [98.1 °F (36.7 °C)-99.2 °F (37.3 °C)] 98.4 °F (36.9 °C)  HR:  [] 95  BP: (141-189)/() 154/106  Resp:  [16-18] 18  SpO2:  [90 %-94 %] 94 %  O2 Device: None (Room air)  Nasal Cannula  O2 Flow Rate (L/min):  [2 L/min] 2 L/min    Body mass index is 51.46 kg/m².     Input and Output Summary (last 24 hours):     Intake/Output Summary (Last 24 hours) at 2/13/2025 0939  Last data filed at 2/13/2025 0914  Gross per 24 hour   Intake 840 ml   Output --   Net 840 ml       Physical Exam  Vitals and nursing note reviewed.   Constitutional:       Appearance: He is obese.   Cardiovascular:      Rate and Rhythm: Normal rate and regular rhythm.   Pulmonary:      Breath sounds: No wheezing.      Comments: Coarse breath sounds throughout, room air  Abdominal:      General: Bowel sounds are normal.      Palpations: Abdomen is soft.      Tenderness: There is no abdominal tenderness.   Musculoskeletal:      Right lower leg: Edema present.      Left lower leg: Edema present.      Comments: +2-+3, compressions in place   Skin:     General: Skin is warm and dry.   Neurological:      Mental Status: He is oriented to person, place, and time. Mental status is at baseline.         Lab Results: I have reviewed the following results:   Results from last 7 days   Lab Units 02/12/25  0543 02/11/25  1552   WBC Thousand/uL 7.46 7.72   HEMOGLOBIN g/dL 15.1 14.6   HEMATOCRIT % 46.0 44.4   PLATELETS Thousands/uL 218 235   SEGS PCT %  --  67   LYMPHO PCT %  --  12*   MONO PCT %  --  18*   EOS PCT %  --  1     Results from last 7 days   Lab Units 02/12/25  0543 02/11/25  1552   SODIUM mmol/L 138 136   POTASSIUM mmol/L 4.0 3.7   CHLORIDE mmol/L 104 103   CO2 mmol/L 25 27   BUN mg/dL 20 12   CREATININE mg/dL 0.89 0.73   ANION GAP mmol/L 9 6   CALCIUM mg/dL 8.9 8.9   ALBUMIN g/dL  --  3.9   TOTAL BILIRUBIN mg/dL  --  0.62   ALK PHOS U/L  --  74   ALT U/L  --  21   AST U/L  --  16   GLUCOSE RANDOM mg/dL 155* 115     Results from last 7 days   Lab Units 02/12/25  0543 02/11/25  1814   LACTIC ACID mmol/L  --  0.7   PROCALCITONIN ng/ml 0.09 0.12     Recent Cultures (last 7 days):   Results from last 7 days   Lab Units 02/12/25  0321  02/11/25  1822 02/11/25  1814   BLOOD CULTURE   --  No Growth at 24 hrs. No Growth at 24 hrs.   LEGIONELLA URINARY ANTIGEN  Negative  --   --      Last 24 Hours Medication List:     Current Facility-Administered Medications:     acetaminophen (TYLENOL) tablet 650 mg, Q6H PRN    albuterol (PROVENTIL HFA,VENTOLIN HFA) inhaler 2 puff, Q4H PRN    [Held by provider] amLODIPine (NORVASC) tablet 10 mg, Daily    azithromycin (ZITHROMAX) tablet 500 mg, Q24H    cefTRIAXone (ROCEPHIN) 1,000 mg in dextrose 5 % 50 mL IVPB, Q24H, Last Rate: 1,000 mg (02/12/25 2223)    enoxaparin (LOVENOX) subcutaneous injection 40 mg, Daily    furosemide (LASIX) injection 20 mg, Once    [START ON 2/14/2025] furosemide (LASIX) tablet 20 mg, Daily    guaiFENesin (MUCINEX) 12 hr tablet 600 mg, BID    ipratropium (ATROVENT) 0.02 % inhalation solution 0.5 mg, TID    labetalol (NORMODYNE) injection 10 mg, Q4H PRN **OR** labetalol (NORMODYNE) injection 20 mg, Q4H PRN    levalbuterol (XOPENEX) inhalation solution 1.25 mg, TID    losartan (COZAAR) tablet 100 mg, QAM    ondansetron (ZOFRAN) injection 4 mg, Q6H PRN    oseltamivir (TAMIFLU) capsule 75 mg, Q12H BRAD    temazepam (RESTORIL) capsule 7.5 mg, HS PRN    Administrative Statements   Today, Patient Was Seen By: Joe Black PA-C    **Please Note: This note may have been constructed using a voice recognition system.**

## 2025-02-13 NOTE — ASSESSMENT & PLAN NOTE
Chest x-ray on admission with increased marking at left base suggestive of viral or atypical lower respiratory tract infection with no pneumonic consolidation  Procalcitonin negative, urine antigens negative  Complete 3-day course of azithromycin today, discontinue Rocephin  Also with bilateral hilar fullness with questionable pulmonary arterial hypertension, see below

## 2025-02-13 NOTE — UTILIZATION REVIEW
Initial Clinical Review    OBSERVATION 2/11 CHANGED TO INPATIENT ON 2/13 @ 1438 - PNEUMONIA, CONTINUED RESP DISTRESS, UNCONTROLLED HTN, NEBS, IV ANTIBIOTICS, STARTING IV LASIX, INSULIN, OVERNIGHT OXYGEN STUDY.     Admission: Date/Time/Statement:   Admission Orders (From admission, onward)       Ordered        02/13/25 1438  INPATIENT ADMISSION  Once            02/11/25 1757  Place in Observation  Once                          Orders Placed This Encounter   Procedures    INPATIENT ADMISSION     Standing Status:   Standing     Number of Occurrences:   1     Level of Care:   Med Surg [16]     Estimated length of stay:   More than 2 Midnights     Certification:   I certify that inpatient services are medically necessary for this patient for a duration of greater than two midnights. See H&P and MD Progress Notes for additional information about the patient's course of treatment.     ED Arrival Information       Expected   -    Arrival   2/11/2025 15:15    Acuity   Urgent              Means of arrival   Walk-In    Escorted by   Self    Service   Hospitalist    Admission type   Emergency              Arrival complaint   High Blood Pressure, Chest Pain             Chief Complaint   Patient presents with    Shortness of Breath     Worsening SOB, worse with exertion. Sent from  due to high blood pressure reading.        Initial Presentation: 54 y.o. male presents to the ED from  Urgent Care with c/o SOB, worse on exertion, fevers.  PMH: MAXIMUS, HTN, melanoma, morbid obesity.  In the ED he was HTN, tachycardic, tachypnic, sats 93%, pain rated 5/10.  Treated with IV fluids, PO Tylenol, IV Labetalol, DUoNebs, IV antibiotics.  Labs - + Flu A.  ECG - ST.  Imaging - L base viral or atypical lower resp infection, poss pulm arterial HTN.    On exam no wheezing, rales. No abd tenderness.  Admitted to Observation Status  with Pneumonia, HTN, obesity -  PLAN: IV antibiotics, procal, urine antigens, sputum culture, Losartan, restarting  Norvasc at 5 mg, weight loss, unable to tolerate CPAP.      Anticipated Length of Stay/Certification Statement: Patient will be admitted on an observation basis with an anticipated length of stay of less than 2 midnights secondary to above .     Date: 2/12 OBSERVATION:    Pneumonia, HTN, obesity - continues to feel unwell, cough, malaise, fatigue.  Eyes have bilat injections, breath sounds w/ rhonchi. Continues to have HTN. Continue Tamilfu, Amlodipine increased to 10 mg daily, PRN IV Labetalol.      Date: 2/13  CHANGED TO INPATIENT STATUS TODAY:    Pneumonia, HTN, obesity, NIDDM -remains on room air, continues with scheduled nebs now PRN, antitussives.  IV antibiotics d/c today. BP improving today. Will start DM meds on d/c, nutrition consult.  Will get Echo, needs overnight home oxygen study tonight.  Having IV Lasix x 1 today and then restart home Lasix 20 mg.  Coughing productively, is SOB but feels better, + BLE edema. Continue with coarse breath sounds throughout, 2 to 3 + BLE edema.  Compression socks on.     Date: 2/14  Day 3: Has surpassed a 2nd midnight with active treatments and services.   Pneumonia, HTN, obesity, NIDDM - pt is d/c today.  On exam has BLE edema, Coarse breath sounds.  Is newly dx DM and being d/c on Metformin.  He feels improved.  Coughing is improved and has less congestion.  Tolerating Lasix w/ good UO.        ED Treatment-Medication Administration from 02/11/2025 1515 to 02/11/2025 1954         Date/Time Order Dose Route Action     02/11/2025 1556 sodium chloride 0.9 % bolus 1,000 mL 1,000 mL Intravenous New Bag     02/11/2025 1554 acetaminophen (TYLENOL) tablet 975 mg 975 mg Oral Given     02/11/2025 1557 labetalol (NORMODYNE) injection 10 mg 10 mg Intravenous Given     02/11/2025 1611 ipratropium-albuterol (DUO-NEB) 0.5-2.5 mg/3 mL inhalation solution 3 mL 3 mL Nebulization Given     02/11/2025 1831 cefepime (MAXIPIME) 2 g/50 mL dextrose IVPB 2,000 mg Intravenous New Bag             Scheduled Medications:  [Held by provider] amLODIPine, 10 mg, Oral, Daily  carvedilol, 6.25 mg, Oral, BID With Meals  enoxaparin, 40 mg, Subcutaneous, Daily  furosemide, 20 mg, Oral, Daily  guaiFENesin, 1,200 mg, Oral, BID  insulin lispro, 1-5 Units, Subcutaneous, TID AC  insulin lispro, 1-5 Units, Subcutaneous, HS  losartan, 100 mg, Oral, QAM  oseltamivir, 75 mg, Oral, Q12H BRAD      Continuous IV Infusions:     PRN Meds:  acetaminophen, 650 mg, Oral, Q6H PRN - x 1 2/12  albuterol, 2 puff, Inhalation, Q4H PRN - x 1 2/11  labetalol, 10 mg, Intravenous, Q4H PRN   Or  labetalol, 20 mg, Intravenous, Q4H PRN - x 1 2/12  levalbuterol, 1.25 mg, Nebulization, Q6H PRN  ondansetron, 4 mg, Intravenous, Q6H PRN  temazepam, 7.5 mg, Oral, HS PRN       ED Triage Vitals   Temperature Pulse Respirations Blood Pressure SpO2 Pain Score   02/11/25 1520 02/11/25 1520 02/11/25 1520 02/11/25 1520 02/11/25 1520 02/11/25 1554   99.3 °F (37.4 °C) (!) 123 (!) 26 (!) 209/115 93 % 5     Weight (last 2 days)       None            Vital Signs (last 3 days)       Date/Time Temp Pulse Resp BP MAP (mmHg) SpO2 Calculated FIO2 (%) - Nasal Cannula Nasal Cannula O2 Flow Rate (L/min) O2 Device Patient Position - Orthostatic VS Troutman Coma Scale Score Pain    02/14/25 13:09:06 -- 90 16 -- -- 96 % -- -- -- -- -- --    02/14/25 1237 -- -- -- 168/100 -- -- -- -- -- Sitting -- --    02/14/25 12:33:57 -- -- -- 165/106 126 -- -- -- -- -- -- --    02/14/25 1033 -- 107 -- 172/102 -- -- -- -- -- -- -- --    02/14/25 1013 -- -- -- 172/102 -- -- -- -- -- Sitting -- --    02/14/25 10:06:45 -- -- -- 172/106 128 -- -- -- -- -- -- --    02/14/25 0850 -- -- -- -- -- -- -- -- None (Room air) -- 15 No Pain    02/14/25 07:32:47 97.7 °F (36.5 °C) -- 16 162/106 125 -- -- -- -- -- -- --    02/14/25 07:30:58 97.7 °F (36.5 °C) -- 16 173/109 130 -- -- -- -- -- -- --    02/13/25 20:31:59 98.4 °F (36.9 °C) -- 18 154/102 119 -- -- -- None (Room air) Sitting -- --    02/13/25  1934 -- -- -- -- -- -- -- -- -- -- 15 No Pain    02/13/25 14:44:52 98.8 °F (37.1 °C) 107 19 157/101 120 91 % -- -- None (Room air) Sitting -- --    02/13/25 1335 -- -- -- -- -- -- -- -- None (Room air) -- -- --    02/13/25 1334 -- -- -- -- -- 94 % -- -- -- -- -- --    02/13/25 10:34:26 -- 101 -- 153/98 116 92 % -- -- -- -- -- --    02/13/25 0800 -- -- -- -- -- 94 % -- -- None (Room air) -- 15 No Pain    02/13/25 0718 -- -- -- -- -- 94 % -- -- None (Room air) -- -- --    02/13/25 07:14:19 98.4 °F (36.9 °C) 95 18 154/106 122 93 % -- -- None (Room air) Lying -- --    02/12/25 2320 -- -- -- -- -- -- 28 2 L/min Nasal cannula -- -- --    02/12/25 21:38:09 98.1 °F (36.7 °C) 97 -- 141/78 99 90 % -- -- -- -- -- --    02/12/25 2013 -- -- -- -- -- 91 % -- -- None (Room air) -- -- --    02/12/25 1956 -- -- -- -- -- -- -- -- -- -- -- 5 02/12/25 19:32:34 98.4 °F (36.9 °C) 105 -- 166/93 117 92 % -- -- -- -- -- --    02/12/25 1930 -- -- -- -- -- -- -- -- None (Room air) -- 15 5 02/12/25 1715 -- 105 -- 174/100 137 93 % -- -- -- -- -- --    02/12/25 15:51:31 -- 111 -- 189/110 136 92 % -- -- -- -- -- --    02/12/25 15:09:41 99.2 °F (37.3 °C) 110 16 162/106 125 93 % -- -- -- -- -- --    02/12/25 1321 -- -- -- -- -- -- -- -- None (Room air) -- -- --    02/12/25 1320 -- -- -- -- -- 94 % -- -- -- -- -- --    02/12/25 0900 -- -- -- -- -- -- -- -- -- -- -- No Pain    02/12/25 07:36:08 99.9 °F (37.7 °C) 100 16 146/105 119 96 % -- -- -- -- -- --    02/12/25 0721 -- -- -- -- -- -- -- -- None (Room air) -- -- --    02/12/25 0720 -- -- -- -- -- 92 % -- -- -- -- -- --    02/12/25 0500 -- 103 -- -- -- 92 % -- -- -- -- -- --    02/11/25 20:16:22 -- 107 19 127/86 100 91 % -- -- -- Sitting 15 No Pain    02/11/25 2001 -- -- 18 -- -- -- -- -- -- -- -- No Pain    02/11/25 19:58:54 98.5 °F (36.9 °C) 106 -- 167/114 132 90 % -- -- -- -- -- --    02/11/25 1745 -- 102 19 146/90 111 92 % -- -- None (Room air) Sitting -- --    02/11/25 1703 -- -- -- --  -- -- -- -- -- -- 15 --    02/11/25 1700 -- 100 18 137/76 100 92 % -- -- None (Room air) Sitting -- --    02/11/25 1645 -- 102 17 148/82 106 92 % -- -- Other (comment) Sitting -- --    02/11/25 1630 -- -- -- -- -- -- -- -- -- -- -- 2    02/11/25 1615 -- 100 14 162/89 118 97 % -- -- Other (comment) Sitting -- --    02/11/25 1554 -- -- -- -- -- -- -- -- -- -- -- 5    02/11/25 1520 99.3 °F (37.4 °C) 123 26 209/115 -- 93 % -- -- -- -- -- --              Pertinent Labs/Diagnostic Test Results:   Radiology:  XR chest 1 view portable   Final Interpretation by Antoni Garcia MD (02/11 1654)      1.  Focal increased lung markings at the left base could suggest viral or atypical lower respiratory infection. No pneumonic consolidation or evidence for air trapping, though.      2.  Bilateral hilar fullness appears similar to priors and shows branching appearance consistent with chronically ectatic central pulmonary arteries. Correlate for history of pulmonary arterial hypertension.            Workstation performed: [id number]           Cardiology:  ECG 12 lead   Final Result by Jayda Pitts MD (02/11 1754)    Age and gender specific ECG analysis    Normal sinus rhythm   Abnormal ECG   When compared with ECG of 11-Feb-2025 15:48,   No significant change was found   Confirmed by Jayda Pitts ([id number]) on 2/11/2025 5:54:46 PM      ECG 12 lead   Final Result by Jayda Pitts MD (02/11 1600)   Age and gender specific ECG analysis    Sinus tachycardia   Otherwise normal ECG   When compared with ECG of 11-Feb-2025 15:47, (unconfirmed)   T wave inversion now evident in Inferior leads   Confirmed by Jayda Pitts ([id number]) on 2/11/2025 4:00:38 PM        GI:  No orders to display       Results from last 7 days   Lab Units 02/11/25  1824   SARS-COV-2  Negative     Results from last 7 days   Lab Units 02/14/25  0442 02/12/25  0543 02/11/25  1552   WBC Thousand/uL 5.79 7.46 7.72   HEMOGLOBIN g/dL 14.9 15.1 14.6   HEMATOCRIT % 45.8  46.0 44.4   PLATELETS Thousands/uL 236 218 235   TOTAL NEUT ABS Thousands/µL  --   --  5.24         Results from last 7 days   Lab Units 02/14/25  0442 02/12/25  0543 02/11/25  1552   SODIUM mmol/L 139 138 136   POTASSIUM mmol/L 4.2 4.0 3.7   CHLORIDE mmol/L 103 104 103   CO2 mmol/L 30 25 27   ANION GAP mmol/L 6 9 6   BUN mg/dL 16 20 12   CREATININE mg/dL 0.68 0.89 0.73   EGFR ml/min/1.73sq m 109 98 106   CALCIUM mg/dL 9.0 8.9 8.9   MAGNESIUM mg/dL 2.0  --   --      Results from last 7 days   Lab Units 02/11/25  1552   AST U/L 16   ALT U/L 21   ALK PHOS U/L 74   TOTAL PROTEIN g/dL 7.2   ALBUMIN g/dL 3.9   TOTAL BILIRUBIN mg/dL 0.62     Results from last 7 days   Lab Units 02/14/25  1105 02/14/25  0731 02/13/25  2125 02/13/25  1634   POC GLUCOSE mg/dl 126 130 203* 214*     Results from last 7 days   Lab Units 02/14/25  0442 02/12/25  0543 02/11/25  1552   GLUCOSE RANDOM mg/dL 132 155* 115         Results from last 7 days   Lab Units 02/13/25  0543   HEMOGLOBIN A1C % 6.7*   EAG mg/dl 146     Results from last 7 days   Lab Units 02/11/25  1731 02/11/25  1552   HS TNI 0HR ng/L  --  10   HS TNI 2HR ng/L 10  --    HSTNI D2 ng/L 0  --                  Results from last 7 days   Lab Units 02/12/25  0543 02/11/25  1814   PROCALCITONIN ng/ml 0.09 0.12     Results from last 7 days   Lab Units 02/11/25  1814   LACTIC ACID mmol/L 0.7             Results from last 7 days   Lab Units 02/11/25  1552   BNP pg/mL 73                                         Results from last 7 days   Lab Units 02/12/25  0321 02/11/25  1824   STREP PNEUMONIAE ANTIGEN, URINE  Negative  --    LEGIONELLA URINARY ANTIGEN  Negative  --    INFLUENZA A PCR   --  Positive*   INFLUENZA B PCR   --  Negative   RSV PCR   --  Negative                             Results from last 7 days   Lab Units 02/11/25  1822 02/11/25  1814   BLOOD CULTURE  No Growth at 48 hrs. No Growth at 48 hrs.                   Past Medical History:   Diagnosis Date    Hypertension      Sleep apnea     No CPAP     Present on Admission:   Malignant melanoma of torso excluding breast (HCC)   HTN (hypertension)   Morbid obesity (HCC)      Admitting Diagnosis: Pneumonia [J18.9]  SOB (shortness of breath) [R06.02]  Hypoxia [R09.02]  Age/Sex: 54 y.o. male    Network Utilization Review Department  ATTENTION: Please call with any questions or concerns to [phone] and carefully listen to the prompts so that you are directed to the right person. All voicemails are confidential.   For Discharge needs, contact Care Management DC Support Team at [phone] opt. [phone]  Send all requests for admission clinical reviews, approved or denied determinations and any other requests to dedicated fax number below belonging to the campus where the patient is receiving treatment. List of dedicated fax numbers for the Facilities:  FACILITY NAME UR FAX NUMBER   ADMISSION DENIALS (Administrative/Medical Necessity) [phone]   DISCHARGE SUPPORT TEAM (NETWORK) [phone]   PARENT CHILD HEALTH (Maternity/NICU/Pediatrics) [phone]   [facility] [phone]   [facility] [phone]   [facility] [phone]   [facility] [phone]   [facility] [phone]   [facility] [phone]   [facility] [phone]   [facility] [phone]   [facility] [phone]   [facility] [phone]   [facility] [phone]   [facility] [phone]

## 2025-02-13 NOTE — ASSESSMENT & PLAN NOTE
Lab Results   Component Value Date    HGBA1C 6.7 (H) 02/13/2025     Recent A1c elevated at 6.7, start sliding scale insulin and Accu-Cheks  Patient and myself discussed his diet and the benefit of weight loss bedside  Will initiate patient on diabetic medication on discharge if he is agreeable  Nutrition consult

## 2025-02-13 NOTE — PLAN OF CARE
Problem: PAIN - ADULT  Goal: Verbalizes/displays adequate comfort level or baseline comfort level  Description: Interventions:  - Encourage patient to monitor pain and request assistance  - Assess pain using appropriate pain scale  - Administer analgesics based on type and severity of pain and evaluate response  - Implement non-pharmacological measures as appropriate and evaluate response  - Consider cultural and social influences on pain and pain management  - Notify physician/advanced practitioner if interventions unsuccessful or patient reports new pain  Outcome: Progressing     Problem: DISCHARGE PLANNING  Goal: Discharge to home or other facility with appropriate resources  Description: INTERVENTIONS:  - Identify barriers to discharge w/patient and caregiver  - Arrange for needed discharge resources and transportation as appropriate  - Identify discharge learning needs (meds, wound care, etc.)  - Arrange for interpretive services to assist at discharge as needed  - Refer to Case Management Department for coordinating discharge planning if the patient needs post-hospital services based on physician/advanced practitioner order or complex needs related to functional status, cognitive ability, or social support system  Outcome: Progressing     Problem: RESPIRATORY - ADULT  Goal: Achieves optimal ventilation and oxygenation  Description: INTERVENTIONS:  - Assess for changes in respiratory status  - Assess for changes in mentation and behavior  - Position to facilitate oxygenation and minimize respiratory effort  - Oxygen administered by appropriate delivery if ordered  - Initiate smoking cessation education as indicated  - Encourage broncho-pulmonary hygiene including cough, deep breathe, Incentive Spirometry  - Assess the need for suctioning and aspirate as needed  - Assess and instruct to report SOB or any respiratory difficulty  - Respiratory Therapy support as indicated  Outcome: Progressing     Problem:  INFECTION - ADULT  Goal: Absence or prevention of progression during hospitalization  Description: INTERVENTIONS:  - Assess and monitor for signs and symptoms of infection  - Monitor lab/diagnostic results  - Monitor all insertion sites, i.e. indwelling lines, tubes, and drains  - Monitor endotracheal if appropriate and nasal secretions for changes in amount and color  - Guin appropriate cooling/warming therapies per order  - Administer medications as ordered  - Instruct and encourage patient and family to use good hand hygiene technique  - Identify and instruct in appropriate isolation precautions for identified infection/condition  Outcome: Progressing     Problem: SAFETY ADULT  Goal: Patient will remain free of falls  Description: INTERVENTIONS:  - Educate patient/family on patient safety including physical limitations  - Instruct patient to call for assistance with activity   - Consult OT/PT to assist with strengthening/mobility   - Keep Call bell within reach  - Keep bed low and locked with side rails adjusted as appropriate  - Keep care items and personal belongings within reach  - Initiate and maintain comfort rounds  - Make Fall Risk Sign visible to staff  - Offer Toileting every 2 Hours, in advance of need  - Initiate/Maintain alarm  - Obtain necessary fall risk management equipment  - Apply yellow socks and bracelet for high fall risk patients  - Consider moving patient to room near nurses station  Outcome: Progressing  Goal: Maintain or return to baseline ADL function  Description: INTERVENTIONS:  -  Assess patient's ability to carry out ADLs; assess patient's baseline for ADL function and identify physical deficits which impact ability to perform ADLs (bathing, care of mouth/teeth, toileting, grooming, dressing, etc.)  - Assess/evaluate cause of self-care deficits   - Assess range of motion  - Assess patient's mobility; develop plan if impaired  - Assess patient's need for assistive devices and  provide as appropriate  - Encourage maximum independence but intervene and supervise when necessary  - Involve family in performance of ADLs  - Assess for home care needs following discharge   - Consider OT consult to assist with ADL evaluation and planning for discharge  - Provide patient education as appropriate  Outcome: Progressing  Goal: Maintains/Returns to pre admission functional level  Description: INTERVENTIONS:  - Perform AM-PAC 6 Click Basic Mobility/ Daily Activity assessment daily.  - Set and communicate daily mobility goal to care team and patient/family/caregiver.   - Collaborate with rehabilitation services on mobility goals if consulted  - Perform Range of Motion 3 times a day.  - Reposition patient every 2 hours.  - Dangle patient 3 times a day  - Stand patient 3 times a day  - Ambulate patient 3 times a day  - Out of bed to chair 3 times a day   - Out of bed for meals 3 times a day  - Out of bed for toileting  - Record patient progress and toleration of activity level   Outcome: Progressing     Problem: Knowledge Deficit  Goal: Patient/family/caregiver demonstrates understanding of disease process, treatment plan, medications, and discharge instructions  Description: Complete learning assessment and assess knowledge base.  Interventions:  - Provide teaching at level of understanding  - Provide teaching via preferred learning methods  Outcome: Progressing

## 2025-02-13 NOTE — ASSESSMENT & PLAN NOTE
Encourage lifestyle changes to promote weight loss   A1c updated - new diabetic, see separate plan

## 2025-02-13 NOTE — ASSESSMENT & PLAN NOTE
Presented with progressive dyspnea on exertion, fever and congestion  Influenza A + 2/11/2025  Started on Tamiflu, continue day 2  Continue antitussives, will change nebulizer therapy over to as needed  Fortunately remains on room air

## 2025-02-13 NOTE — ASSESSMENT & PLAN NOTE
Elevated on admission, continue losartan 100 mg daily  Patient was not taking amlodipine prior to admission and this dosing has been increased.  Will discontinue this due to his lower extremity swelling  Resuming home Lasix 20 mg daily  Continue IV labetalol as needed  BP improving today  Recommend low-salt diet, nutrition consult

## 2025-02-14 ENCOUNTER — APPOINTMENT (INPATIENT)
Dept: NON INVASIVE DIAGNOSTICS | Facility: HOSPITAL | Age: 55
DRG: 195 | End: 2025-02-14
Payer: COMMERCIAL

## 2025-02-14 VITALS
HEIGHT: 72 IN | WEIGHT: 315 LBS | SYSTOLIC BLOOD PRESSURE: 168 MMHG | BODY MASS INDEX: 42.66 KG/M2 | DIASTOLIC BLOOD PRESSURE: 100 MMHG | RESPIRATION RATE: 16 BRPM | TEMPERATURE: 97.7 F | OXYGEN SATURATION: 96 % | HEART RATE: 90 BPM

## 2025-02-14 LAB
ANION GAP SERPL CALCULATED.3IONS-SCNC: 6 MMOL/L (ref 4–13)
ASCENDING AORTA: 3.8 CM
BSA FOR ECHO PROCEDURE: 2.8 M2
BUN SERPL-MCNC: 16 MG/DL (ref 5–25)
CALCIUM SERPL-MCNC: 9 MG/DL (ref 8.4–10.2)
CHLORIDE SERPL-SCNC: 103 MMOL/L (ref 96–108)
CO2 SERPL-SCNC: 30 MMOL/L (ref 21–32)
CREAT SERPL-MCNC: 0.68 MG/DL (ref 0.6–1.3)
E WAVE DECELERATION TIME: 215.3 MS
E/A RATIO: 1
ERYTHROCYTE [DISTWIDTH] IN BLOOD BY AUTOMATED COUNT: 14.3 % (ref 11.6–15.1)
FRACTIONAL SHORTENING: 11 % (ref 28–44)
GFR SERPL CREATININE-BSD FRML MDRD: 109 ML/MIN/1.73SQ M
GLUCOSE SERPL-MCNC: 126 MG/DL (ref 65–140)
GLUCOSE SERPL-MCNC: 130 MG/DL (ref 65–140)
GLUCOSE SERPL-MCNC: 132 MG/DL (ref 65–140)
HCT VFR BLD AUTO: 45.8 % (ref 36.5–49.3)
HGB BLD-MCNC: 14.9 G/DL (ref 12–17)
INTERVENTRICULAR SEPTUM IN DIASTOLE (PARASTERNAL SHORT AXIS VIEW): 1.4 CM
LEFT ATRIUM SIZE: 4.1 CM
LEFT INTERNAL DIMENSION IN SYSTOLE: 4.11 CM (ref 2.1–4)
LEFT VENTRICLE DIASTOLIC VOLUME (MOD BIPLANE): 214.9 ML
LEFT VENTRICLE DIASTOLIC VOLUME INDEX (MOD BIPLANE): 76.8 ML/M2
LEFT VENTRICLE SYSTOLIC VOLUME (MOD BIPLANE): 103 ML
LEFT VENTRICLE SYSTOLIC VOLUME INDEX (MOD BIPLANE): 36.8 ML/M2
LEFT VENTRICULAR INTERNAL DIMENSION IN DIASTOLE: 4.6 CM (ref 3.5–6)
LEFT VENTRICULAR POSTERIOR WALL IN END DIASTOLE: 1.3 CM
LV EF US.2D.A4C+ESTIMATED: 51 %
MAGNESIUM SERPL-MCNC: 2 MG/DL (ref 1.9–2.7)
MCH RBC QN AUTO: 27.7 PG (ref 26.8–34.3)
MCHC RBC AUTO-ENTMCNC: 32.5 G/DL (ref 31.4–37.4)
MCV RBC AUTO: 85 FL (ref 82–98)
MV E'TISSUE VEL-LAT: 1.2 CM/S
MV E'TISSUE VEL-SEP: 1.4 CM/S
MV PEAK A VEL: 11.8 M/S
MV PEAK E VEL: 11.8 CM/S
MV STENOSIS PRESSURE HALF TIME: 62 MS
MV VALVE AREA P 1/2 METHOD: 3.55 CM2
PLATELET # BLD AUTO: 236 THOUSANDS/UL (ref 149–390)
PMV BLD AUTO: 9.6 FL (ref 8.9–12.7)
POTASSIUM SERPL-SCNC: 4.2 MMOL/L (ref 3.5–5.3)
RBC # BLD AUTO: 5.38 MILLION/UL (ref 3.88–5.62)
RIGHT ATRIUM AREA SYSTOLE A4C: 14.1 CM2
RIGHT VENTRICLE ID DIMENSION: 3.1 CM
SL CV LV EF: 60
SODIUM SERPL-SCNC: 139 MMOL/L (ref 135–147)
TRICUSPID ANNULAR PLANE SYSTOLIC EXCURSION: 3 CM
WBC # BLD AUTO: 5.79 THOUSAND/UL (ref 4.31–10.16)

## 2025-02-14 PROCEDURE — 99239 HOSP IP/OBS DSCHRG MGMT >30: CPT

## 2025-02-14 PROCEDURE — 83735 ASSAY OF MAGNESIUM: CPT

## 2025-02-14 PROCEDURE — 82948 REAGENT STRIP/BLOOD GLUCOSE: CPT

## 2025-02-14 PROCEDURE — 80048 BASIC METABOLIC PNL TOTAL CA: CPT

## 2025-02-14 PROCEDURE — 94762 N-INVAS EAR/PLS OXIMTRY CONT: CPT

## 2025-02-14 PROCEDURE — 85027 COMPLETE CBC AUTOMATED: CPT

## 2025-02-14 PROCEDURE — 93306 TTE W/DOPPLER COMPLETE: CPT | Performed by: STUDENT IN AN ORGANIZED HEALTH CARE EDUCATION/TRAINING PROGRAM

## 2025-02-14 PROCEDURE — 93306 TTE W/DOPPLER COMPLETE: CPT

## 2025-02-14 RX ORDER — LOSARTAN POTASSIUM 100 MG/1
100 TABLET ORAL EVERY MORNING
Qty: 30 TABLET | Refills: 0 | Status: SHIPPED | OUTPATIENT
Start: 2025-02-14

## 2025-02-14 RX ORDER — CARVEDILOL 6.25 MG/1
6.25 TABLET ORAL 2 TIMES DAILY WITH MEALS
Status: DISCONTINUED | OUTPATIENT
Start: 2025-02-14 | End: 2025-02-14 | Stop reason: HOSPADM

## 2025-02-14 RX ORDER — FUROSEMIDE 20 MG/1
20 TABLET ORAL DAILY
Qty: 30 TABLET | Refills: 0 | Status: SHIPPED | OUTPATIENT
Start: 2025-02-15

## 2025-02-14 RX ORDER — CARVEDILOL 6.25 MG/1
6.25 TABLET ORAL 2 TIMES DAILY WITH MEALS
Qty: 60 TABLET | Refills: 0 | Status: SHIPPED | OUTPATIENT
Start: 2025-02-14

## 2025-02-14 RX ORDER — ALBUTEROL SULFATE 90 UG/1
2 INHALANT RESPIRATORY (INHALATION) EVERY 6 HOURS PRN
Qty: 6.7 G | Refills: 0 | Status: SHIPPED | OUTPATIENT
Start: 2025-02-14

## 2025-02-14 RX ORDER — OSELTAMIVIR PHOSPHATE 75 MG/1
75 CAPSULE ORAL EVERY 12 HOURS SCHEDULED
Qty: 5 CAPSULE | Refills: 0 | Status: SHIPPED | OUTPATIENT
Start: 2025-02-14 | End: 2025-02-17

## 2025-02-14 RX ADMIN — GUAIFENESIN 1200 MG: 600 TABLET, EXTENDED RELEASE ORAL at 08:51

## 2025-02-14 RX ADMIN — CARVEDILOL 6.25 MG: 6.25 TABLET, FILM COATED ORAL at 10:50

## 2025-02-14 RX ADMIN — LOSARTAN POTASSIUM 100 MG: 50 TABLET, FILM COATED ORAL at 08:51

## 2025-02-14 RX ADMIN — FUROSEMIDE 20 MG: 20 TABLET ORAL at 08:51

## 2025-02-14 RX ADMIN — OSELTAMIVIR PHOSPHATE 75 MG: 75 CAPSULE ORAL at 08:51

## 2025-02-14 NOTE — ASSESSMENT & PLAN NOTE
Reports he does not like the noise from his CPAP machine and does not wear this  Has been having desaturations overnight  Overnight sleep study noted over 20 desaturations with the lowest being 68%  Patient being sent home with 2 L nasal cannula at nighttime.  Appreciate case management help in setting this up  Should consider repeat outpatient sleep medicine evaluation

## 2025-02-14 NOTE — PLAN OF CARE
Problem: PAIN - ADULT  Goal: Verbalizes/displays adequate comfort level or baseline comfort level  Description: Interventions:  - Encourage patient to monitor pain and request assistance  - Assess pain using appropriate pain scale  - Administer analgesics based on type and severity of pain and evaluate response  - Implement non-pharmacological measures as appropriate and evaluate response  - Consider cultural and social influences on pain and pain management  - Notify physician/advanced practitioner if interventions unsuccessful or patient reports new pain  Outcome: Progressing     Problem: DISCHARGE PLANNING  Goal: Discharge to home or other facility with appropriate resources  Description: INTERVENTIONS:  - Identify barriers to discharge w/patient and caregiver  - Arrange for needed discharge resources and transportation as appropriate  - Identify discharge learning needs (meds, wound care, etc.)  - Arrange for interpretive services to assist at discharge as needed  - Refer to Case Management Department for coordinating discharge planning if the patient needs post-hospital services based on physician/advanced practitioner order or complex needs related to functional status, cognitive ability, or social support system  Outcome: Progressing     Problem: RESPIRATORY - ADULT  Goal: Achieves optimal ventilation and oxygenation  Description: INTERVENTIONS:  - Assess for changes in respiratory status  - Assess for changes in mentation and behavior  - Position to facilitate oxygenation and minimize respiratory effort  - Oxygen administered by appropriate delivery if ordered  - Initiate smoking cessation education as indicated  - Encourage broncho-pulmonary hygiene including cough, deep breathe, Incentive Spirometry  - Assess the need for suctioning and aspirate as needed  - Assess and instruct to report SOB or any respiratory difficulty  - Respiratory Therapy support as indicated  Outcome: Progressing     Problem:  INFECTION - ADULT  Goal: Absence or prevention of progression during hospitalization  Description: INTERVENTIONS:  - Assess and monitor for signs and symptoms of infection  - Monitor lab/diagnostic results  - Monitor all insertion sites, i.e. indwelling lines, tubes, and drains  - Monitor endotracheal if appropriate and nasal secretions for changes in amount and color  - [place] appropriate cooling/warming therapies per order  - Administer medications as ordered  - Instruct and encourage patient and family to use good hand hygiene technique  - Identify and instruct in appropriate isolation precautions for identified infection/condition  Outcome: Progressing     Problem: SAFETY ADULT  Goal: Patient will remain free of falls  Description: INTERVENTIONS:  - Educate patient/family on patient safety including physical limitations  - Instruct patient to call for assistance with activity   - Consult OT/PT to assist with strengthening/mobility   - Keep Call bell within reach  - Keep bed low and locked with side rails adjusted as appropriate  - Keep care items and personal belongings within reach  - Initiate and maintain comfort rounds  - Make Fall Risk Sign visible to staff  - Offer Toileting every 2 Hours, in advance of need  - Initiate/Maintain bed alarm  - Obtain necessary fall risk management equipment.  - Apply yellow socks and bracelet for high fall risk patients  - Consider moving patient to room near nurses station  Outcome: Progressing  Goal: Maintain or return to baseline ADL function  Description: INTERVENTIONS:  -  Assess patient's ability to carry out ADLs; assess patient's baseline for ADL function and identify physical deficits which impact ability to perform ADLs (bathing, care of mouth/teeth, toileting, grooming, dressing, etc.)  - Assess/evaluate cause of self-care deficits   - Assess range of motion  - Assess patient's mobility; develop plan if impaired  - Assess patient's need for assistive devices and  provide as appropriate  - Encourage maximum independence but intervene and supervise when necessary  - Involve family in performance of ADLs  - Assess for home care needs following discharge   - Consider OT consult to assist with ADL evaluation and planning for discharge  - Provide patient education as appropriate  Outcome: Progressing  Goal: Maintains/Returns to pre admission functional level  Description: INTERVENTIONS:  - Perform AM-PAC 6 Click Basic Mobility/ Daily Activity assessment daily.  - Set and communicate daily mobility goal to care team and patient/family/caregiver.   - Collaborate with rehabilitation services on mobility goals if consulted  - Perform Range of Motion 2 times a day.  - Reposition patient every 2 hours.  - Dangle patient 2 times a day  - Stand patient 2 times a day  - Ambulate patient 2 times a day  - Out of bed to chair 2 times a day   - Out of bed for meals 2 times a day  - Out of bed for toileting  - Record patient progress and toleration of activity level   Outcome: Progressing     Problem: Knowledge Deficit  Goal: Patient/family/caregiver demonstrates understanding of disease process, treatment plan, medications, and discharge instructions  Description: Complete learning assessment and assess knowledge base.  Interventions:  - Provide teaching at level of understanding  - Provide teaching via preferred learning methods  Outcome: Progressing

## 2025-02-14 NOTE — NURSING NOTE
Reviewed discharge paperwork with patient. All questions and concerns answered at this time. IV and masimo removed. Patient and belongings accompanied to lobby by PCA.

## 2025-02-14 NOTE — ASSESSMENT & PLAN NOTE
Lab Results   Component Value Date    HGBA1C 6.7 (H) 02/13/2025     Recent A1c elevated at 6.7, blood sugars inpatient averaging 120-150  Patient and myself discussed his diet and the benefit of weight loss bedside  Home on metformin once daily with breakfast.  We discussed the side effects and patient is agreeable to start.    We discussed obtaining a glucometer over-the-counter for patient to monitor his daily sugars, keep a log and follow-up with PCP

## 2025-02-14 NOTE — CASE MANAGEMENT
Case Management Discharge Planning Note    Patient name Da Line  Location [facility] 2 /South 2 M* MRN [id number]  : 1970 Date 2025       Current Admission Date: 2025  Current Admission Diagnosis:Influenza A   Patient Active Problem List    Diagnosis Date Noted Date Diagnosed    Pulmonary arterial hypertension (HCC) 2025     Type 2 diabetes mellitus, without long-term current use of insulin (HCC) 2025     Abnormal CXR 2025     Influenza A 2025     MAXIMUS (obstructive sleep apnea) 2025     Venous stasis 10/21/2024     Tinea cruris 10/19/2024     Morbid obesity (HCC) 10/19/2024     Abscess of left groin 10/18/2024     HTN (hypertension) 2024     PUD (peptic ulcer disease) 2024     Malignant melanoma of torso excluding breast (HCC) 05/10/2024       LOS (days): 1  Geometric Mean LOS (GMLOS) (days):   Days to GMLOS:     OBJECTIVE:  Risk of Unplanned Readmission Score: 12.83         Current admission status: Inpatient   Preferred Pharmacy:   [facility] [id number] [facility] [address] [facility]  [address] [facility] [address]  Phone: [phone] Fax: [phone]    [facility]/pharmacy #[id number] [place], [place] - [address] [facility] [address] [facility]  [address] [facility] [address]  Phone: [phone] Fax: [phone]    [facility] #[id number] [facility] [address] [address]  Phone: [phone] Fax: [phone]    Primary Care Provider: Jose Jaen DO    Primary Insurance: [facility]  Secondary Insurance:     DISCHARGE DETAILS:       Additional Comments: CM provided patient with [facility] contact information, in follow up providers- patient agreeable and provided patient information in [place]. CM to follow for further discharge planning.

## 2025-02-14 NOTE — CASE MANAGEMENT
Case Management Discharge Planning Note    Patient name Da Line  Location [place] [address] /South 2 M* MRN [id number]  : 1970 Date 2025       Current Admission Date: 2025  Current Admission Diagnosis:Influenza A   Patient Active Problem List    Diagnosis Date Noted Date Diagnosed    Pulmonary arterial hypertension (HCC) 2025     Type 2 diabetes mellitus, without long-term current use of insulin (HCC) 2025     Abnormal CXR 2025     Influenza A 2025     MAXIMUS (obstructive sleep apnea) 2025     Venous stasis 10/21/2024     Tinea cruris 10/19/2024     Morbid obesity (HCC) 10/19/2024     Abscess of left groin 10/18/2024     HTN (hypertension) 2024     PUD (peptic ulcer disease) 2024     Malignant melanoma of torso excluding breast (HCC) 05/10/2024       LOS (days): 1  Geometric Mean LOS (GMLOS) (days):   Days to GMLOS:     OBJECTIVE:  Risk of Unplanned Readmission Score: 12.83         Current admission status: Inpatient   Preferred Pharmacy:   [facility] [id number] [facility] [address] [facility]  [address] [facility] [address]  Phone: [phone] Fax: [phone]    [facility]/pharmacy #[id number] [place], [place] - [address] [facility]  [address] [facility] [address]  Phone: [phone] Fax: [phone]    [facility] DRUG STORE #[id number] [facility] [address] [address]  Phone: [phone] Fax: [phone]    Primary Care Provider: Jose Jane DO    Primary Insurance: [facility]  Secondary Insurance:     DISCHARGE DETAILS:    Discharge planning discussed with:: Patient        CM contacted family/caregiver?: No- see comments (Patient declined need for CM to contact family at this time.)  Were Treatment Team discharge recommendations reviewed with patient/caregiver?: Yes  Did patient/caregiver verbalize understanding of patient care needs?: Yes     Contacts  Patient Contacts:  DARSHAN RINALDI (Uncle)  Relationship to Patient:: Family  Contact Method: Phone  Phone Number: [phone] (Mobile)  Reason/Outcome: Emergency Contact    Requested Home Health Care         Is the patient interested in HHC at discharge?: No    DME Referral Provided  Referral made for DME?: Yes  DME referral completed for the following items:: Home Oxygen concentrator (Nocturnal 2L NC)  DME Supplier Name:: [employer]    Other Referral/Resources/Interventions Provided:  Interventions: DME  Referral Comments: Home o2 (nocturnal)      Treatment Team Recommendation: Home  Discharge Destination Plan:: Home       Additional Comments: CM spoke with patient to complete assessment; patient alert. CM discussed request for home oxygen, patient agreeable. CM pending SLIM PA signature; CM sent signature reminder order in [place]. Patient confirmed returned CPap a significant amount of years ago. Patient confirmed independent, is employed full time and drives self. CM to follow for further discharge planning.

## 2025-02-14 NOTE — NUTRITION
02/14/25 1417   Biochemical Data,Medical Tests, and Procedures   Biochemical Data/Medical Tests/Procedures Lab values reviewed;Meds reviewed   Labs (Comment) Na 139, K 4.2, BUN 16, Cr. 68, Glucose 126-214   Meds (Comment) Abuterol, Lovenox, Lasix, Lispro, Atrovent, Cozaar, Zofran, Normodyne   Nutrition-Focused Physical Exam   Nutrition-Focused Physical Exam Findings RN skin assessment reviewed;Edema   Nutrition-Focused Physical Exam Findings Edema noted in RLE/LLE +1 mild pitting.   Medical-Related Concerns Pt admitted with influenza and newly diagnosed with diabetes. PMH MAXIMUS, HTN, melanoma, and morbid obesity.   Adequacy of Intake   Nutrition Modality PO   Feeding Route   PO Independent   Current PO Intake   Current Diet Order Regular diet   Current Meal Intake %   Estimated Calorie Intake %   Estimated Protein Intake  %   Estimated Fluid Intake %   Estimated calorie intake compared to estimated need Patient reports good appetite and good PO intake, likely meeting 100% of estimated needs.   Recommendations/Interventions   Malnutrition/BMI Present Yes   Provider already documenting morbid obesity Yes   Adult BMI Classifications Morbid Obesity 50-59.9   Interventions/Recommendations Adjust diet order   Recommendations to Provider Recommend to change diet to Carb Controlled Level 3 90g CHO, and 2gm Sodium diet.   Education Assessment   Education Education initiated/ completed   Education Notes Discussed including more vegetables when ordering foods, and whole grains. Provided DM Plate and Carbohydrate Counting education handouts. Pt was receptive to education.   Nutrition Complexity Risk   Nutrition complexity level Low risk   Follow up date 02/21/25

## 2025-02-14 NOTE — DISCHARGE INSTR - AVS FIRST PAGE
Dear Da Rogel,     It was our pleasure to care for you here at [facility].     New medication Adjustments -   You were started on Lasix.  This is also called furosemide.  You should take a 20 mg tablet every morning.  This is a water pill which will help your legs as well as breathing and blood pressure.  This pill should make you urinate a lot.  Your losartan was refilled.  Please take this daily  You are started on carvedilol.  This is also called Coreg.  You will take 1 pill in the morning and 1 in the evening.  This is important for your heart rate and blood pressure.  The rest of the Tamiflu was sent to the pharmacy to treat the flu.  You will take this twice a day.  Your next dose is this evening  You will start metformin.  This is a pill for your diabetes as you are a new diabetic. You will take this in the morning with breakfast.  This can cause loose stools which is a common side effect.  Give this approximately 2 weeks.  If it is consistent or bothersome please notify provider.    Important follow up information -   Consider talking to your family doctor about getting reevaluated with the sleep medicine doctor in the outpatient setting.  There are other options for sleep apnea treatment.  With weight loss sleep apnea can improve.    Other Instructions -   Follow-up with a family doctor within 1 week.  Let them know you were hospitalized    Continue to wear your oxygen at nighttime at home.  You should wear 2 L at night    You can get a glucometer machine to check your sugars.  Your sugars here were well-controlled.  You can check your sugar 1-2 times a day and keep a log.  Then you can take this to your family doctor.  Since you are a diabetic you will need baseline eye and foot exams.  This is why we will be important to follow-up with the family doctor    You should also keep an eye on your blood pressure.  The true way to check your blood pressure is to be relaxed, not  eating/drinking or talking.  You should have taken your morning meds and then check it around 2 hours after.  You can write this down and take this to your family doctor as well. At any point if your blood pressure is above 180/100 with chest pain or headache or below 90/60 with lightheaded/dizziness, let a provider know/ come to the emergency room.

## 2025-02-14 NOTE — ASSESSMENT & PLAN NOTE
Presented with progressive dyspnea on exertion, fever and congestion  Influenza A + 2/11/2025  Started on Tamiflu, received 2-1/2 days.  Will continue course on discharge for 5 days.  Fortunately remains on room air without any evidence of bronchospasm, congestion improving.  Continue antitussives as needed

## 2025-02-14 NOTE — ASSESSMENT & PLAN NOTE
Chest x-ray on admission with increased marking at left base suggestive of viral or atypical lower respiratory tract infection with no pneumonic consolidation  Procalcitonin negative, urine antigens negative  Complete 3-day course of azithromycin  Presenting symptoms have improved and patient remains afebrile  Recommend outpatient follow-up with PCP, can consider repeat chest imaging in 4 to 6 weeks

## 2025-02-14 NOTE — ASSESSMENT & PLAN NOTE
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

## 2025-02-14 NOTE — PLAN OF CARE
Problem: PAIN - ADULT  Goal: Verbalizes/displays adequate comfort level or baseline comfort level  Description: Interventions:  - Encourage patient to monitor pain and request assistance  - Assess pain using appropriate pain scale  - Administer analgesics based on type and severity of pain and evaluate response  - Implement non-pharmacological measures as appropriate and evaluate response  - Consider cultural and social influences on pain and pain management  - Notify physician/advanced practitioner if interventions unsuccessful or patient reports new pain  Outcome: Progressing     Problem: DISCHARGE PLANNING  Goal: Discharge to home or other facility with appropriate resources  Description: INTERVENTIONS:  - Identify barriers to discharge w/patient and caregiver  - Arrange for needed discharge resources and transportation as appropriate  - Identify discharge learning needs (meds, wound care, etc.)  - Arrange for interpretive services to assist at discharge as needed  - Refer to Case Management Department for coordinating discharge planning if the patient needs post-hospital services based on physician/advanced practitioner order or complex needs related to functional status, cognitive ability, or social support system  Outcome: Progressing     Problem: RESPIRATORY - ADULT  Goal: Achieves optimal ventilation and oxygenation  Description: INTERVENTIONS:  - Assess for changes in respiratory status  - Assess for changes in mentation and behavior  - Position to facilitate oxygenation and minimize respiratory effort  - Oxygen administered by appropriate delivery if ordered  - Initiate smoking cessation education as indicated  - Encourage broncho-pulmonary hygiene including cough, deep breathe, Incentive Spirometry  - Assess the need for suctioning and aspirate as needed  - Assess and instruct to report SOB or any respiratory difficulty  - Respiratory Therapy support as indicated  Outcome: Progressing     Problem:  INFECTION - ADULT  Goal: Absence or prevention of progression during hospitalization  Description: INTERVENTIONS:  - Assess and monitor for signs and symptoms of infection  - Monitor lab/diagnostic results  - Monitor all insertion sites, i.e. indwelling lines, tubes, and drains  - Monitor endotracheal if appropriate and nasal secretions for changes in amount and color  - [place] appropriate cooling/warming therapies per order  - Administer medications as ordered  - Instruct and encourage patient and family to use good hand hygiene technique  - Identify and instruct in appropriate isolation precautions for identified infection/condition  Outcome: Progressing     Problem: SAFETY ADULT  Goal: Patient will remain free of falls  Description: INTERVENTIONS:  - Educate patient/family on patient safety including physical limitations  - Instruct patient to call for assistance with activity   - Consult OT/PT to assist with strengthening/mobility   - Keep Call bell within reach  - Keep bed low and locked with side rails adjusted as appropriate  - Keep care items and personal belongings within reach  - Initiate and maintain comfort rounds  - Make Fall Risk Sign visible to staff  - Obtain necessary fall risk management equipment  - Apply yellow socks and bracelet for high fall risk patients  - Consider moving patient to room near nurses station  Outcome: Progressing  Goal: Maintain or return to baseline ADL function  Description: INTERVENTIONS:  -  Assess patient's ability to carry out ADLs; assess patient's baseline for ADL function and identify physical deficits which impact ability to perform ADLs (bathing, care of mouth/teeth, toileting, grooming, dressing, etc.)  - Assess/evaluate cause of self-care deficits   - Assess range of motion  - Assess patient's mobility; develop plan if impaired  - Assess patient's need for assistive devices and provide as appropriate  - Encourage maximum independence but intervene and supervise  when necessary  - Involve family in performance of ADLs  - Assess for home care needs following discharge   - Consider OT consult to assist with ADL evaluation and planning for discharge  - Provide patient education as appropriate  Outcome: Progressing  Goal: Maintains/Returns to pre admission functional level  Description: INTERVENTIONS:  - Perform AM-PAC 6 Click Basic Mobility/ Daily Activity assessment daily.  - Set and communicate daily mobility goal to care team and patient/family/caregiver.   - Collaborate with rehabilitation services on mobility goals if consulted  - Perform Range of Motion 3 times a day.  - Reposition patient every 2 hours.  - Dangle patient 3 times a day  - Stand patient 3 times a day  - Ambulate patient 3 times a day  - Out of bed to chair 3 times a day   - Out of bed for meals 3 times a day  - Out of bed for toileting  - Record patient progress and toleration of activity level   Outcome: Progressing     Problem: Knowledge Deficit  Goal: Patient/family/caregiver demonstrates understanding of disease process, treatment plan, medications, and discharge instructions  Description: Complete learning assessment and assess knowledge base.  Interventions:  - Provide teaching at level of understanding  - Provide teaching via preferred learning methods  Outcome: Progressing     Problem: METABOLIC, FLUID AND ELECTROLYTES - ADULT  Goal: Electrolytes maintained within normal limits  Description: INTERVENTIONS:  - Monitor labs and assess patient for signs and symptoms of electrolyte imbalances  - Administer electrolyte replacement as ordered  - Monitor response to electrolyte replacements, including repeat lab results as appropriate  - Instruct patient on fluid and nutrition as appropriate  Outcome: Progressing  Goal: Fluid balance maintained  Description: INTERVENTIONS:  - Monitor labs   - Monitor I/O and WT  - Instruct patient on fluid and nutrition as appropriate  - Assess for signs & symptoms of  volume excess or deficit  Outcome: Progressing  Goal: Glucose maintained within target range  Description: INTERVENTIONS:  - Monitor Blood Glucose as ordered  - Assess for signs and symptoms of hyperglycemia and hypoglycemia  - Administer ordered medications to maintain glucose within target range  - Assess nutritional intake and initiate nutrition service referral as needed  Outcome: Progressing

## 2025-02-14 NOTE — ASSESSMENT & PLAN NOTE
With history of elevated uncontrolled blood pressure.  Patient also has untreated sleep apnea  Prior to admission was not taking his amlodipine or losartan  Resumed on losartan 100 mg daily  Amlodipine discontinued due to lower extremity swelling and patient resumed on Lasix 20 mg daily  Started on carvedilol 6.25 mg twice daily with improvement in BP  Continue medication regimen on discharge, encouraged compliance. Low salt diet  Discussed with patient the importance of establishing care and following up with PCP to monitor BP

## 2025-02-14 NOTE — DISCHARGE SUMMARY
Discharge Summary - Hospitalist   Name: Da Rogel 54 y.o. male I MRN: [id number]  Unit/Bed#: [place] [address] -01 I Date of Admission: 2/11/2025   Date of Service: 2/14/2025 I Hospital Day: 1     Assessment & Plan  Influenza A  Presented with progressive dyspnea on exertion, fever and congestion  Influenza A + 2/11/2025  Started on Tamiflu, received 2-1/2 days.  Will continue course on discharge for 5 days.  Fortunately remains on room air without any evidence of bronchospasm, congestion improving.  Continue antitussives as needed  Abnormal CXR  Chest x-ray on admission with increased marking at left base suggestive of viral or atypical lower respiratory tract infection with no pneumonic consolidation  Procalcitonin negative, urine antigens negative  Complete 3-day course of azithromycin  Presenting symptoms have improved and patient remains afebrile  Recommend outpatient follow-up with PCP, can consider repeat chest imaging in 4 to 6 weeks  HTN (hypertension)  With history of elevated uncontrolled blood pressure.  Patient also has untreated sleep apnea  Prior to admission was not taking his amlodipine or losartan  Resumed on losartan 100 mg daily  Amlodipine discontinued due to lower extremity swelling and patient resumed on Lasix 20 mg daily  Started on carvedilol 6.25 mg twice daily with improvement in BP  Continue medication regimen on discharge, encouraged compliance. Low salt diet  Discussed with patient the importance of establishing care and following up with PCP to monitor BP  Type 2 diabetes mellitus, without long-term current use of insulin ([facility])  Lab Results   Component Value Date    HGBA1C 6.7 (H) 02/13/2025     Recent A1c elevated at 6.7, blood sugars inpatient averaging 120-150  Patient and myself discussed his diet and the benefit of weight loss bedside  Home on metformin once daily with breakfast.  We discussed the side effects and patient is agreeable to start.    We discussed obtaining a  glucometer over-the-counter for patient to monitor his daily sugars, keep a log and follow-up with PCP  MAXIMUS (obstructive sleep apnea)  Reports he does not like the noise from his CPAP machine and does not wear this  Has been having desaturations overnight  Overnight sleep study noted over 20 desaturations with the lowest being 68%  Patient being sent home with 2 L nasal cannula at nighttime.  Appreciate case management help in setting this up  Should consider repeat outpatient sleep medicine evaluation  Pulmonary arterial hypertension (HCC)  Suspected on CXR, suspect in setting of WHO group 2/3  Due to patient's lower extremity swelling, chronic ELIZALDE and abnormal chest x-ray started on Lasix 20 mg daily  Echocardiogram with preserved EF, pulmonary pressures hard to obtain due to difficult study  Continue Lasix 20 mg daily and oxygen therapy at night time  Malignant melanoma of torso excluding breast (HCC)  Stage 1b melanoma of back.   Continue outpt follow up with surgical oncology   Morbid obesity (HCC)  Encourage lifestyle changes to promote weight loss   A1c updated - new diabetic, see separate plan     Medical Problems       Resolved Problems  Date Reviewed: 2/11/2025   None       Discharging Physician / Practitioner: Joe Black PA-C  PCP: Jose Jane DO  Admission Date:   Admission Orders (From admission, onward)       Ordered        02/13/25 1438  INPATIENT ADMISSION  Once            02/11/25 1757  Place in Observation  Once                          Discharge Date: 02/14/25    Consultations During Hospital Stay:  None    Procedures Performed:   None    Significant Findings / Test Results:   Echo complete w/ contrast if indicated  Result Date: 2/14/2025  Narrative:   Left Ventricle: Left ventricular cavity size is normal. Wall thickness is increased. There is moderate concentric hypertrophy. The left ventricular ejection fraction is 60% by visual estimation. Systolic function is normal. Although no diagnostic  regional wall motion abnormality was identified, this possibility cannot be completely excluded on the basis of this study. Diastolic function is normal.   Right Ventricle: Right ventricular cavity size is normal. Systolic function is normal. Technically difficult study.     XR chest 1 view portable  Result Date: 2/11/2025  Impression: 1.  Focal increased lung markings at the left base could suggest viral or atypical lower respiratory infection. No pneumonic consolidation or evidence for air trapping, though. 2.  Bilateral hilar fullness appears similar to priors and shows branching appearance consistent with chronically ectatic central pulmonary arteries. Correlate for history of pulmonary arterial hypertension.     Test Results Pending at Discharge (will require follow up):   None     Outpatient Tests Requested:  Consider outpatient chest x-ray in 4 weeks with PCP  Outpatient PCP follow-up for diabetic management including ophthalmologic and podiatry exams  Consider outpatient repeat sleep evaluation    Complications:  None    Reason for Admission: Influenza A    Hospital Course:   Da Rogel is a 54 y.o. male patient who originally presented to the hospital on 2/11/2025 due to progressive dyspnea on exertion, fever and congestion.  Patient tested positive for influenza and chest x-ray imaging was concerning for left lower lobe viral or atypical infection.  Patient was started on Tamiflu as well as antibiotics and given supportive care with nebulizer therapy and antitussives.  Patient's infectious workup including pneumonia antigens, procalcitonin and blood cultures were negative.  Patient completed a 3-day course of antibiotics for atypical coverage and improved with nebulizer and supportive care.  He remained off oxygen during the day.  Patient was noted to desaturate at night and completed an overnight sleep study which displayed over 20 desaturations below 89%.  Home oxygen therapy is recommended and was  coordinated for delivery by case management.  Due to patient's morbid obesity, untreated sleep apnea, uncontrolled blood pressure and lower extremity swelling an echocardiogram and A1C were obtained.  Echocardiogram displayed preserved EF however was a difficult study and pulmonary pressures were not able to be obtained.  Patient was resumed back on home blood pressure medications as well as started on Lasix and had adequate urine output.  Patient will continue antihypertensive treatment regimen on discharge and follow-up in the outpatient with PCP.  Due to new diagnosis of diabetes he will be started on metformin.  He notes he is committed to losing weight and making better decisions for himself.  Will be in a glucometer over-the-counter to keep an eye on daily sugars.    Please see above list of diagnoses and related plan for additional information.     Condition at Discharge: fair    Discharge Day Visit / Exam:   Subjective: Patient seen and examined.  He notes that his cough is feeling better and his congestion is less.  He denies any fevers, chills, chest pain or shortness of breath.  He notes after the Lasix yesterday he urinated a lot.  Still has some lower leg swelling but is tolerating the Lasix well.  We discussed his A1c and elevated blood pressure.  He notes he will need medications on discharge as well as to follow-up and find a good family doctor.  He is agreeable to starting medications for diabetes as well as wearing oxygen at night.    Physical Exam  Vitals and nursing note reviewed.   Constitutional:       Appearance: He is obese. He is not toxic-appearing or diaphoretic.   Cardiovascular:      Rate and Rhythm: Normal rate and regular rhythm.   Pulmonary:      Effort: Pulmonary effort is normal. No respiratory distress.      Breath sounds: No wheezing.      Comments: Coarse BS bases, room air  Abdominal:      General: Bowel sounds are normal.      Palpations: Abdomen is soft.      Tenderness: There  is no abdominal tenderness. There is no guarding or rebound.   Musculoskeletal:      Right lower leg: Edema present.      Left lower leg: Edema present.      Comments: Chronic venous discoloration bilateral lower extremities. Pitting edema ~+2   Skin:     General: Skin is warm and dry.   Neurological:      Mental Status: He is alert and oriented to person, place, and time. Mental status is at baseline.        Discussion with Family: Patient    Discharge instructions/Information to patient and family:   See after visit summary for information provided to patient and family.      Provisions for Follow-Up Care:  See after visit summary for information related to follow-up care and any pertinent home health orders.      Mobility at time of Discharge:   Basic Mobility Inpatient Raw Score: 24  JH-HLM Goal: 8: Walk 250 feet or more  JH-HLM Achieved: 8: Walk 250 feet ot more  HLM Goal achieved. Continue to encourage appropriate mobility.     Disposition:   Home    Planned Readmission: No    Discharge Medications:  See after visit summary for reconciled discharge medications provided to patient and/or family.      Administrative Statements   Discharge Statement:  I have spent a total time of 45 minutes in caring for this patient on the day of the visit/encounter.     **Please Note: This note may have been constructed using a voice recognition system**

## 2025-02-15 LAB
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
## 2025-02-16 LAB
BACTERIA BLD CULT: NORMAL
BACTERIA BLD CULT: NORMAL

## 2025-02-17 NOTE — UTILIZATION REVIEW
NOTIFICATION OF ADMISSION DISCHARGE   This is a Notification of Discharge from [facility]. Please be advised that this patient has been discharge from our facility. Below you will find the admission and discharge date and time including the patient’s disposition.   UTILIZATION REVIEW CONTACT:  Sonia Dunbar MA  Utilization   Network Utilization Review Department  Phone: [phone] x carefully listen to the prompts. All voicemails are confidential.  Email: [email or web address]     ADMISSION INFORMATION  PRESENTATION DATE: 2/11/2025  3:21 PM  OBERVATION ADMISSION DATE: 02/11/2025 1757  INPATIENT ADMISSION DATE: 2/13/25  2:38 PM   DISCHARGE DATE: 2/14/2025  4:59 PM   DISPOSITION:Home/Self Care    Network Utilization Review Department  ATTENTION: Please call with any questions or concerns to [phone] and carefully listen to the prompts so that you are directed to the right person. All voicemails are confidential.   For Discharge needs, contact Care Management DC Support Team at [phone] opt. 2  Send all requests for admission clinical reviews, approved or denied determinations and any other requests to dedicated fax number below belonging to the campus where the patient is receiving treatment. List of dedicated fax numbers for the Facilities:  FACILITY NAME UR FAX NUMBER   ADMISSION DENIALS (Administrative/Medical Necessity) [phone]   DISCHARGE SUPPORT TEAM ([facility]) [phone]   PARENT CHILD HEALTH (Maternity/NICU/Pediatrics) [phone]   [facility] [phone]   [facility] [phone]   [facility] [phone]   [facility] [phone]   [facility] [phone]   [facility] [phone]   [facility] [phone]   [facility]  [place] [phone]   [facility] [phone]   [facility] [phone]   [facility] [phone]   [facility] [phone]

## 2025-03-15 PROBLEM — J10.1 INFLUENZA A: Status: RESOLVED | Noted: 2025-02-11 | Resolved: 2025-03-15

## 2025-07-16 ENCOUNTER — OFFICE VISIT (OUTPATIENT)
Dept: URGENT CARE | Facility: MEDICAL CENTER | Age: 55
End: 2025-07-16
Payer: COMMERCIAL

## 2025-07-16 VITALS
DIASTOLIC BLOOD PRESSURE: 84 MMHG | SYSTOLIC BLOOD PRESSURE: 152 MMHG | RESPIRATION RATE: 18 BRPM | HEART RATE: 103 BPM | OXYGEN SATURATION: 99 % | TEMPERATURE: 98 F

## 2025-07-16 DIAGNOSIS — L03.115 CELLULITIS OF RIGHT LOWER EXTREMITY: ICD-10-CM

## 2025-07-16 DIAGNOSIS — I87.2 VENOUS STASIS DERMATITIS OF BOTH LOWER EXTREMITIES: Primary | ICD-10-CM

## 2025-07-16 DIAGNOSIS — Z76.89 ENCOUNTER TO ESTABLISH CARE: ICD-10-CM

## 2025-07-16 PROCEDURE — 99213 OFFICE O/P EST LOW 20 MIN: CPT

## 2025-07-16 RX ORDER — BUMETANIDE 2 MG/1
1 TABLET ORAL DAILY
COMMUNITY
Start: 2025-06-15

## 2025-07-16 RX ORDER — TRIAMCINOLONE ACETONIDE 1 MG/G
OINTMENT TOPICAL
COMMUNITY
Start: 2025-05-16

## 2025-07-16 RX ORDER — CEPHALEXIN 500 MG/1
500 CAPSULE ORAL EVERY 8 HOURS SCHEDULED
Qty: 21 CAPSULE | Refills: 0 | Status: SHIPPED | OUTPATIENT
Start: 2025-07-16 | End: 2025-07-23

## 2025-07-16 RX ORDER — MUPIROCIN 2 %
OINTMENT (GRAM) TOPICAL
COMMUNITY
Start: 2025-05-16
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
## 2025-07-16 NOTE — PATIENT INSTRUCTIONS
Prescribed Keflex, take as directed.  Wash area daily with soap and water.  Apply emollients to area, recommend use of petroleum jelly.  Keep any open areas covered with nonadherent bandages.  Refer to wound care placed for follow-up  Referral to family practice placed for follow-up.    Follow up with PCP in 3-5 days.  Proceed to  ER if symptoms worsen.    If tests are performed, our office will contact you with results only if changes need to made to the care plan discussed with you at the visit. You can review your full results on [facility] MyChart.    Patient Education     Cellulitis (skin infection) in adults - Discharge instructions   The Basics   Written by the doctors and editors at [facility]   What are discharge instructions? -- Discharge instructions are information about how to take care of yourself after getting medical care for a health problem.  What is cellulitis? -- Cellulitis is a skin infection (figure 1). It can happen when germs get into the skin. Normally, different types of germs live on a person's skin. Most of the time, these germs do not cause any problems. But if a person gets a cut or a break in the skin, the germs can get into the skin and cause an infection.  You need antibiotics to treat cellulitis. Usually, this involves taking antibiotic pills. Just putting antibiotic ointment on the skin does not work. It is important to take all of your antibiotics even if you start to feel better.  How do I care for myself at home? -- Ask the doctor or nurse what you should do when you go home. Make sure that you understand exactly what you need to do to care for yourself. Ask questions if there is anything you do not understand.  You should also:   Prop your painful body part on pillows, keeping it above the level of your heart. This might help lessen pain and swelling.   Keep the infected area clean and dry. Do not squeeze, scratch, or rub it. You can gently wash the area with soap and water or  take a shower. Pat the area dry with a clean towel.   Wash your hands before and after you touch the infected area.  When should I call the doctor? -- Call for advice if:   You have a fever of 101°F (38.4°C) or higher, or chills.   The area becomes more red, swollen, or painful, or the redness or swelling spreads up your leg or arm or to a larger area.   The infected area is not better after 3 days of taking antibiotics.  All topics are updated as new evidence becomes available and our peer review process is complete.  This topic retrieved from [employer] on: Mar 06, 2024.  Topic 754100 Version 1.0  Release: 32.2.4 - C32.64  © 2024 UpToDate, Inc. and/or its affiliates. All rights reserved.  figure 1: Cellulitis     Cellulitis is an infection of the skin. These infections can cause redness, pain, and/or swelling.  Graphic 339473 Version 1.0  Consumer Information Use and Disclaimer   Disclaimer: This generalized information is a limited summary of diagnosis, treatment, and/or medication information. It is not meant to be comprehensive and should be used as a tool to help the user understand and/or assess potential diagnostic and treatment options. It does NOT include all information about conditions, treatments, medications, side effects, or risks that may apply to a specific patient. It is not intended to be medical advice or a substitute for the medical advice, diagnosis, or treatment of a health care provider based on the health care provider's examination and assessment of a patient's specific and unique circumstances. Patients must speak with a health care provider for complete information about their health, medical questions, and treatment options, including any risks or benefits regarding use of medications. This information does not endorse any treatments or medications as safe, effective, or approved for treating a specific patient. UpToDate, Inc. and its affiliates disclaim any warranty or liability relating  to this information or the use thereof.The use of this information is governed by the Terms of Use, available at https://www.wolterskluwer.com/en/know/clinical-effectiveness-terms. 2024© UpToDate, Inc. and its affiliates and/or licensors. All rights reserved.  Copyright   © 2024 [employer] and/or its affiliates. All rights reserved.

## 2025-07-24 ENCOUNTER — OFFICE VISIT (OUTPATIENT)
Dept: URGENT CARE | Facility: MEDICAL CENTER | Age: 55
End: 2025-07-24
Payer: COMMERCIAL

## 2025-07-24 VITALS
DIASTOLIC BLOOD PRESSURE: 90 MMHG | RESPIRATION RATE: 18 BRPM | TEMPERATURE: 98.6 F | HEART RATE: 90 BPM | OXYGEN SATURATION: 95 % | SYSTOLIC BLOOD PRESSURE: 156 MMHG

## 2025-07-24 DIAGNOSIS — L03.115 CELLULITIS OF RIGHT LOWER LEG: Primary | ICD-10-CM

## 2025-07-24 PROCEDURE — 99213 OFFICE O/P EST LOW 20 MIN: CPT | Performed by: PHYSICIAN ASSISTANT

## 2025-07-24 RX ORDER — DOXYCYCLINE 100 MG/1
100 TABLET ORAL 2 TIMES DAILY
Qty: 14 TABLET | Refills: 0 | Status: SHIPPED | OUTPATIENT
Start: 2025-07-24 | End: 2025-07-31
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
## 2025-07-24 NOTE — PATIENT INSTRUCTIONS
Stop Keflex.  Start doxycycline take 1 tablet every 12 hours for 7 days.  Take antibiotic on full stomach.  Limit sunlight while on antibiotic.  Any increased redness, pain in right calf or worsening of symptoms go directly to ED.    Follow-up with PCP and/or wound care.    Patient is aware that at this time I do recommend going to the ED for further evaluation.  Patient was told that since he failed oral antibiotics he may need IV antibiotics.  At this time patient declined.

## 2025-07-28 ENCOUNTER — APPOINTMENT (EMERGENCY)
Dept: RADIOLOGY | Facility: HOSPITAL | Age: 55
End: 2025-07-28
Payer: COMMERCIAL

## 2025-07-28 ENCOUNTER — HOSPITAL ENCOUNTER (EMERGENCY)
Facility: HOSPITAL | Age: 55
Discharge: HOME | End: 2025-07-28
Attending: EMERGENCY MEDICINE | Admitting: EMERGENCY MEDICINE
Payer: COMMERCIAL

## 2025-07-28 ENCOUNTER — APPOINTMENT (EMERGENCY)
Dept: NON INVASIVE DIAGNOSTICS | Facility: HOSPITAL | Age: 55
End: 2025-07-28
Payer: COMMERCIAL

## 2025-07-28 VITALS
HEART RATE: 97 BPM | WEIGHT: 315 LBS | TEMPERATURE: 98.1 F | DIASTOLIC BLOOD PRESSURE: 98 MMHG | OXYGEN SATURATION: 97 % | BODY MASS INDEX: 53.22 KG/M2 | RESPIRATION RATE: 18 BRPM | SYSTOLIC BLOOD PRESSURE: 190 MMHG

## 2025-07-28 DIAGNOSIS — R93.89 ABNORMAL CXR: ICD-10-CM

## 2025-07-28 DIAGNOSIS — K27.9 PUD (PEPTIC ULCER DISEASE): ICD-10-CM

## 2025-07-28 DIAGNOSIS — E66.01 MORBID OBESITY (HCC): ICD-10-CM

## 2025-07-28 DIAGNOSIS — G47.33 OSA (OBSTRUCTIVE SLEEP APNEA): ICD-10-CM

## 2025-07-28 DIAGNOSIS — C43.59 MALIGNANT MELANOMA OF TORSO EXCLUDING BREAST (HCC): ICD-10-CM

## 2025-07-28 DIAGNOSIS — B35.6 TINEA CRURIS: ICD-10-CM

## 2025-07-28 DIAGNOSIS — L02.214 ABSCESS OF LEFT GROIN: ICD-10-CM

## 2025-07-28 DIAGNOSIS — M79.604 RIGHT LEG PAIN: Primary | ICD-10-CM

## 2025-07-28 DIAGNOSIS — I27.21 PULMONARY ARTERIAL HYPERTENSION (HCC): ICD-10-CM

## 2025-07-28 DIAGNOSIS — I87.8 VENOUS STASIS: ICD-10-CM

## 2025-07-28 LAB
ALBUMIN SERPL BCG-MCNC: 4 G/DL (ref 3.5–5)
ALP SERPL-CCNC: 72 U/L (ref 34–104)
ALT SERPL W P-5'-P-CCNC: 16 U/L (ref 7–52)
ANION GAP SERPL CALCULATED.3IONS-SCNC: 4 MMOL/L (ref 4–13)
AST SERPL W P-5'-P-CCNC: 13 U/L (ref 13–39)
ATRIAL RATE: 91 BPM
BASOPHILS # BLD AUTO: 0.07 THOUSANDS/ÂΜL (ref 0–0.1)
BASOPHILS NFR BLD AUTO: 1 % (ref 0–1)
BILIRUB SERPL-MCNC: 0.53 MG/DL (ref 0.2–1)
BNP SERPL-MCNC: 24 PG/ML (ref 0–100)
BUN SERPL-MCNC: 12 MG/DL (ref 5–25)
CALCIUM SERPL-MCNC: 8.9 MG/DL (ref 8.4–10.2)
CARDIAC TROPONIN I PNL SERPL HS: 6 NG/L (ref ?–50)
CHLORIDE SERPL-SCNC: 104 MMOL/L (ref 96–108)
CO2 SERPL-SCNC: 31 MMOL/L (ref 21–32)
CREAT SERPL-MCNC: 0.68 MG/DL (ref 0.6–1.3)
EOSINOPHIL # BLD AUTO: 0.39 THOUSAND/ÂΜL (ref 0–0.61)
EOSINOPHIL NFR BLD AUTO: 4 % (ref 0–6)
ERYTHROCYTE [DISTWIDTH] IN BLOOD BY AUTOMATED COUNT: 13.9 % (ref 11.6–15.1)
GFR SERPL CREATININE-BSD FRML MDRD: 109 ML/MIN/1.73SQ M
GLUCOSE SERPL-MCNC: 157 MG/DL (ref 65–140)
HCT VFR BLD AUTO: 44.9 % (ref 36.5–49.3)
HGB BLD-MCNC: 14.7 G/DL (ref 12–17)
IMM GRANULOCYTES # BLD AUTO: 0.04 THOUSAND/UL (ref 0–0.2)
IMM GRANULOCYTES NFR BLD AUTO: 0 % (ref 0–2)
LYMPHOCYTES # BLD AUTO: 1.24 THOUSANDS/ÂΜL (ref 0.6–4.47)
LYMPHOCYTES NFR BLD AUTO: 14 % (ref 14–44)
MCH RBC QN AUTO: 27.8 PG (ref 26.8–34.3)
MCHC RBC AUTO-ENTMCNC: 32.7 G/DL (ref 31.4–37.4)
MCV RBC AUTO: 85 FL (ref 82–98)
MONOCYTES # BLD AUTO: 0.79 THOUSAND/ÂΜL (ref 0.17–1.22)
MONOCYTES NFR BLD AUTO: 9 % (ref 4–12)
NEUTROPHILS # BLD AUTO: 6.53 THOUSANDS/ÂΜL (ref 1.85–7.62)
NEUTS SEG NFR BLD AUTO: 72 % (ref 43–75)
NRBC BLD AUTO-RTO: 0 /100 WBCS
P AXIS: 61 DEGREES
PLATELET # BLD AUTO: 243 THOUSANDS/UL (ref 149–390)
PMV BLD AUTO: 9.2 FL (ref 8.9–12.7)
POTASSIUM SERPL-SCNC: 4.2 MMOL/L (ref 3.5–5.3)
PR INTERVAL: 224 MS
PROT SERPL-MCNC: 7.1 G/DL (ref 6.4–8.4)
QRS AXIS: 52 DEGREES
QRSD INTERVAL: 102 MS
QT INTERVAL: 374 MS
QTC INTERVAL: 460 MS
RBC # BLD AUTO: 5.28 MILLION/UL (ref 3.88–5.62)
SODIUM SERPL-SCNC: 139 MMOL/L (ref 135–147)
T WAVE AXIS: 28 DEGREES
VENTRICULAR RATE: 91 BPM
WBC # BLD AUTO: 9.06 THOUSAND/UL (ref 4.31–10.16)

## 2025-07-28 PROCEDURE — 73590 X-RAY EXAM OF LOWER LEG: CPT

## 2025-07-28 PROCEDURE — 80053 COMPREHEN METABOLIC PANEL: CPT

## 2025-07-28 PROCEDURE — 93010 ELECTROCARDIOGRAM REPORT: CPT | Performed by: INTERNAL MEDICINE

## 2025-07-28 PROCEDURE — 36415 COLL VENOUS BLD VENIPUNCTURE: CPT

## 2025-07-28 PROCEDURE — 99285 EMERGENCY DEPT VISIT HI MDM: CPT | Performed by: EMERGENCY MEDICINE

## 2025-07-28 PROCEDURE — 87070 CULTURE OTHR SPECIMN AEROBIC: CPT

## 2025-07-28 PROCEDURE — 87077 CULTURE AEROBIC IDENTIFY: CPT

## 2025-07-28 PROCEDURE — 93005 ELECTROCARDIOGRAM TRACING: CPT

## 2025-07-28 PROCEDURE — 85025 COMPLETE CBC W/AUTO DIFF WBC: CPT

## 2025-07-28 PROCEDURE — 87186 SC STD MICRODIL/AGAR DIL: CPT

## 2025-07-28 PROCEDURE — 99285 EMERGENCY DEPT VISIT HI MDM: CPT

## 2025-07-28 PROCEDURE — 83880 ASSAY OF NATRIURETIC PEPTIDE: CPT

## 2025-07-28 PROCEDURE — 84484 ASSAY OF TROPONIN QUANT: CPT

## 2025-07-28 PROCEDURE — 93971 EXTREMITY STUDY: CPT

## 2025-07-28 PROCEDURE — 87205 SMEAR GRAM STAIN: CPT

## 2025-07-28 PROCEDURE — 93971 EXTREMITY STUDY: CPT | Performed by: STUDENT IN AN ORGANIZED HEALTH CARE EDUCATION/TRAINING PROGRAM

## 2025-07-28 RX ORDER — CARVEDILOL 6.25 MG/1
6.25 TABLET ORAL 2 TIMES DAILY WITH MEALS
Status: DISCONTINUED | OUTPATIENT
Start: 2025-07-28 | End: 2025-07-28

## 2025-07-28 RX ORDER — LOSARTAN POTASSIUM 50 MG/1
100 TABLET ORAL ONCE
Status: COMPLETED | OUTPATIENT
Start: 2025-07-28 | End: 2025-07-28

## 2025-07-28 RX ORDER — SULFAMETHOXAZOLE AND TRIMETHOPRIM 800; 160 MG/1; MG/1
1 TABLET ORAL 2 TIMES DAILY
Qty: 14 TABLET | Refills: 0 | Status: SHIPPED | OUTPATIENT
Start: 2025-07-28 | End: 2025-08-04

## 2025-07-28 RX ORDER — BACITRACIN, NEOMYCIN, POLYMYXIN B 400; 3.5; 5 [USP'U]/G; MG/G; [USP'U]/G
1 OINTMENT TOPICAL ONCE
Status: COMPLETED | OUTPATIENT
Start: 2025-07-28 | End: 2025-07-28

## 2025-07-28 RX ADMIN — LOSARTAN POTASSIUM 100 MG: 50 TABLET, FILM COATED ORAL at 11:14

## 2025-07-28 RX ADMIN — BACITRACIN ZINC, NEOMYCIN, POLYMYXIN B 1 SMALL APPLICATION: 400; 3.5; 5 OINTMENT TOPICAL at 10:42

## 2025-07-28 RX ADMIN — CARVEDILOL 6.25 MG: 6.25 TABLET, FILM COATED ORAL at 11:14

## 2025-07-30 LAB
BACTERIA WND AEROBE CULT: ABNORMAL
GRAM STN SPEC: ABNORMAL
GRAM STN SPEC: ABNORMAL

## 2025-07-31 RX ORDER — CIPROFLOXACIN 500 MG/1
500 TABLET, FILM COATED ORAL 2 TIMES DAILY
Qty: 20 TABLET | Refills: 0 | Status: SHIPPED | OUTPATIENT
Start: 2025-07-31 | End: 2025-08-10

## (undated) DEVICE — SCD SEQUENTIAL COMPRESSION COMFORT SLEEVE MEDIUM KNEE LENGTH: Brand: KENDALL SCD

## (undated) DEVICE — SINGLE PORT MANIFOLD: Brand: NEPTUNE 2

## (undated) DEVICE — POOLE SUCTION HANDLE: Brand: CARDINAL HEALTH

## (undated) DEVICE — SUT MONOCRYL 4-0 PS-2 27 IN Y426H

## (undated) DEVICE — PROVE COVER: Brand: UNBRANDED

## (undated) DEVICE — 2000CC GUARDIAN II: Brand: GUARDIAN

## (undated) DEVICE — 3M™ STERI-STRIP™ COMPOUND BENZOIN TINCTURE 40 BAGS/CARTON 4 CARTONS/CASE C1544: Brand: 3M™ STERI-STRIP™

## (undated) DEVICE — CAUTERY TIP POLISHER: Brand: DEVON

## (undated) DEVICE — LIGACLIP MCA MULTIPLE CLIP APPLIERS, 20 SMALL CLIPS: Brand: LIGACLIP

## (undated) DEVICE — SUT VICRYL 3-0 SH 27 IN J416H

## (undated) DEVICE — ELECTRODE NEEDLE E-Z CLEAN 6IN -0016

## (undated) DEVICE — 10FR FRAZIER SUCTION HANDLE: Brand: CARDINAL HEALTH

## (undated) DEVICE — GAUZE SPONGES,USP TYPE VII GAUZE, 12 PLY: Brand: CURITY

## (undated) DEVICE — MICRO HVTSA, 0.5G AND HVTSA SOURCEMARK PRODUCT CODE M1206 AND M1206-01: Brand: EXOFIN MICRO HVTSA, 0.5G

## (undated) DEVICE — NEEDLE 25G X 1 1/2

## (undated) DEVICE — SUT VICRYL 2-0 CT-1 36 IN J945H

## (undated) DEVICE — ELECTRODE NEEDLE MOD E-Z CLEAN 2.75IN 7CM -0013M

## (undated) DEVICE — SUT ETHILON 2-0 FS 18 IN 664H

## (undated) DEVICE — PLUMEPEN PRO 10FT

## (undated) DEVICE — 3M™ TEGADERM™ TRANSPARENT FILM DRESSING FRAME STYLE, 1628, 6 IN X 8 IN (15 CM X 20 CM), 10/CT 8CT/CASE: Brand: 3M™ TEGADERM™

## (undated) DEVICE — SUT SILK 3-0 SH 30 IN K832H

## (undated) DEVICE — CHLORAPREP HI-LITE 26ML ORANGE

## (undated) DEVICE — BETHLEHEM UNIVERSAL MINOR GEN: Brand: CARDINAL HEALTH

## (undated) DEVICE — GLOVE INDICATOR PI UNDERGLOVE SZ 7 BLUE

## (undated) DEVICE — INTENDED FOR TISSUE SEPARATION, AND OTHER PROCEDURES THAT REQUIRE A SHARP SURGICAL BLADE TO PUNCTURE OR CUT.: Brand: BARD-PARKER SAFETY BLADES SIZE 15, STERILE

## (undated) DEVICE — CHEST/BREAST DRAPE: Brand: CONVERTORS

## (undated) DEVICE — DRAPE EQUIPMENT RF WAND

## (undated) DEVICE — SPECIMEN CONTAINER STERILE PEEL PACK

## (undated) DEVICE — GLOVE SRG BIOGEL ECLIPSE 7

## (undated) DEVICE — TUBING SUCTION 5MM X 12 FT